# Patient Record
Sex: FEMALE | Race: WHITE | NOT HISPANIC OR LATINO | Employment: FULL TIME | URBAN - METROPOLITAN AREA
[De-identification: names, ages, dates, MRNs, and addresses within clinical notes are randomized per-mention and may not be internally consistent; named-entity substitution may affect disease eponyms.]

---

## 2017-01-17 ENCOUNTER — ALLSCRIPTS OFFICE VISIT (OUTPATIENT)
Dept: OTHER | Facility: OTHER | Age: 47
End: 2017-01-17

## 2017-03-29 ENCOUNTER — ALLSCRIPTS OFFICE VISIT (OUTPATIENT)
Dept: OTHER | Facility: OTHER | Age: 47
End: 2017-03-29

## 2017-03-29 DIAGNOSIS — G43.909 MIGRAINE WITHOUT STATUS MIGRAINOSUS, NOT INTRACTABLE: ICD-10-CM

## 2017-04-11 ENCOUNTER — GENERIC CONVERSION - ENCOUNTER (OUTPATIENT)
Dept: OTHER | Facility: OTHER | Age: 47
End: 2017-04-11

## 2017-04-11 ENCOUNTER — APPOINTMENT (OUTPATIENT)
Dept: PHYSICAL THERAPY | Facility: CLINIC | Age: 47
End: 2017-04-11
Payer: COMMERCIAL

## 2017-04-11 DIAGNOSIS — G43.909 MIGRAINE WITHOUT STATUS MIGRAINOSUS, NOT INTRACTABLE: ICD-10-CM

## 2017-04-11 PROCEDURE — 97140 MANUAL THERAPY 1/> REGIONS: CPT

## 2017-04-11 PROCEDURE — 97110 THERAPEUTIC EXERCISES: CPT

## 2017-04-11 PROCEDURE — 97162 PT EVAL MOD COMPLEX 30 MIN: CPT

## 2017-04-12 ENCOUNTER — ALLSCRIPTS OFFICE VISIT (OUTPATIENT)
Dept: OTHER | Facility: OTHER | Age: 47
End: 2017-04-12

## 2017-04-12 LAB
FLUAV AG SPEC QL IA: NEGATIVE
INFLUENZA B AG (HISTORICAL): NEGATIVE

## 2017-04-14 ENCOUNTER — APPOINTMENT (OUTPATIENT)
Dept: PHYSICAL THERAPY | Facility: CLINIC | Age: 47
End: 2017-04-14
Payer: COMMERCIAL

## 2017-04-18 ENCOUNTER — APPOINTMENT (OUTPATIENT)
Dept: PHYSICAL THERAPY | Facility: CLINIC | Age: 47
End: 2017-04-18
Payer: COMMERCIAL

## 2017-04-18 PROCEDURE — 97140 MANUAL THERAPY 1/> REGIONS: CPT

## 2017-04-18 PROCEDURE — 97014 ELECTRIC STIMULATION THERAPY: CPT

## 2017-04-18 PROCEDURE — 97110 THERAPEUTIC EXERCISES: CPT

## 2017-04-20 ENCOUNTER — APPOINTMENT (OUTPATIENT)
Dept: PHYSICAL THERAPY | Facility: CLINIC | Age: 47
End: 2017-04-20
Payer: COMMERCIAL

## 2017-04-20 ENCOUNTER — ALLSCRIPTS OFFICE VISIT (OUTPATIENT)
Dept: OTHER | Facility: OTHER | Age: 47
End: 2017-04-20

## 2017-04-20 PROCEDURE — 97014 ELECTRIC STIMULATION THERAPY: CPT

## 2017-04-20 PROCEDURE — 97110 THERAPEUTIC EXERCISES: CPT

## 2017-04-20 PROCEDURE — 97140 MANUAL THERAPY 1/> REGIONS: CPT

## 2017-04-25 ENCOUNTER — APPOINTMENT (OUTPATIENT)
Dept: PHYSICAL THERAPY | Facility: CLINIC | Age: 47
End: 2017-04-25
Payer: COMMERCIAL

## 2017-04-25 PROCEDURE — 97140 MANUAL THERAPY 1/> REGIONS: CPT

## 2017-04-25 PROCEDURE — 97010 HOT OR COLD PACKS THERAPY: CPT

## 2017-04-25 PROCEDURE — 97014 ELECTRIC STIMULATION THERAPY: CPT

## 2017-04-25 PROCEDURE — 97110 THERAPEUTIC EXERCISES: CPT

## 2017-04-27 ENCOUNTER — APPOINTMENT (OUTPATIENT)
Dept: PHYSICAL THERAPY | Facility: CLINIC | Age: 47
End: 2017-04-27
Payer: COMMERCIAL

## 2017-04-27 PROCEDURE — 97140 MANUAL THERAPY 1/> REGIONS: CPT

## 2017-04-27 PROCEDURE — 97110 THERAPEUTIC EXERCISES: CPT

## 2017-04-27 PROCEDURE — 97014 ELECTRIC STIMULATION THERAPY: CPT

## 2017-05-02 ENCOUNTER — APPOINTMENT (OUTPATIENT)
Dept: PHYSICAL THERAPY | Facility: CLINIC | Age: 47
End: 2017-05-02
Payer: COMMERCIAL

## 2017-05-04 ENCOUNTER — APPOINTMENT (OUTPATIENT)
Dept: PHYSICAL THERAPY | Facility: CLINIC | Age: 47
End: 2017-05-04
Payer: COMMERCIAL

## 2017-05-04 ENCOUNTER — ALLSCRIPTS OFFICE VISIT (OUTPATIENT)
Dept: OTHER | Facility: OTHER | Age: 47
End: 2017-05-04

## 2017-05-15 ENCOUNTER — APPOINTMENT (OUTPATIENT)
Dept: PHYSICAL THERAPY | Facility: CLINIC | Age: 47
End: 2017-05-15
Payer: COMMERCIAL

## 2017-05-15 PROCEDURE — 97014 ELECTRIC STIMULATION THERAPY: CPT

## 2017-05-15 PROCEDURE — 97140 MANUAL THERAPY 1/> REGIONS: CPT

## 2017-05-15 PROCEDURE — 97110 THERAPEUTIC EXERCISES: CPT

## 2017-05-17 ENCOUNTER — APPOINTMENT (OUTPATIENT)
Dept: PHYSICAL THERAPY | Facility: CLINIC | Age: 47
End: 2017-05-17
Payer: COMMERCIAL

## 2017-05-17 PROCEDURE — 97110 THERAPEUTIC EXERCISES: CPT

## 2017-05-17 PROCEDURE — 97140 MANUAL THERAPY 1/> REGIONS: CPT

## 2017-06-22 ENCOUNTER — GENERIC CONVERSION - ENCOUNTER (OUTPATIENT)
Dept: OTHER | Facility: OTHER | Age: 47
End: 2017-06-22

## 2017-07-05 ENCOUNTER — APPOINTMENT (OUTPATIENT)
Dept: LAB | Facility: HOSPITAL | Age: 47
End: 2017-07-05

## 2017-07-05 ENCOUNTER — TRANSCRIBE ORDERS (OUTPATIENT)
Dept: LAB | Facility: HOSPITAL | Age: 47
End: 2017-07-05

## 2017-07-05 DIAGNOSIS — Z11.1 SCREENING FOR TUBERCULOSIS: ICD-10-CM

## 2017-07-05 DIAGNOSIS — Z11.1 SCREENING FOR TUBERCULOSIS: Primary | ICD-10-CM

## 2017-07-05 PROCEDURE — 86480 TB TEST CELL IMMUN MEASURE: CPT

## 2017-07-05 PROCEDURE — 36415 COLL VENOUS BLD VENIPUNCTURE: CPT

## 2017-07-07 LAB
ANNOTATION COMMENT IMP: NORMAL
GAMMA INTERFERON BACKGROUND BLD IA-ACNC: 0.05 IU/ML
M TB IFN-G BLD-IMP: NEGATIVE
M TB IFN-G CD4+ BCKGRND COR BLD-ACNC: 0 IU/ML
M TB IFN-G CD4+ T-CELLS BLD-ACNC: 0.05 IU/ML
MITOGEN IGNF BLD-ACNC: 2.79 IU/ML
QUANTIFERON-TB GOLD IN TUBE: NORMAL
SERVICE CMNT-IMP: NORMAL

## 2017-07-31 ENCOUNTER — GENERIC CONVERSION - ENCOUNTER (OUTPATIENT)
Dept: OTHER | Facility: OTHER | Age: 47
End: 2017-07-31

## 2017-08-11 ENCOUNTER — ALLSCRIPTS OFFICE VISIT (OUTPATIENT)
Dept: OTHER | Facility: OTHER | Age: 47
End: 2017-08-11

## 2017-08-11 DIAGNOSIS — A09 INFECTIOUS GASTROENTERITIS AND COLITIS: ICD-10-CM

## 2017-09-14 ENCOUNTER — TRANSCRIBE ORDERS (OUTPATIENT)
Dept: ADMINISTRATIVE | Facility: HOSPITAL | Age: 47
End: 2017-09-14

## 2017-09-14 ENCOUNTER — APPOINTMENT (OUTPATIENT)
Dept: LAB | Facility: HOSPITAL | Age: 47
End: 2017-09-14
Payer: COMMERCIAL

## 2017-09-14 DIAGNOSIS — Z11.1 SCREENING EXAMINATION FOR PULMONARY TUBERCULOSIS: ICD-10-CM

## 2017-09-14 DIAGNOSIS — Z11.1 SCREENING EXAMINATION FOR PULMONARY TUBERCULOSIS: Primary | ICD-10-CM

## 2017-09-14 PROCEDURE — 86480 TB TEST CELL IMMUN MEASURE: CPT

## 2017-09-14 PROCEDURE — 36415 COLL VENOUS BLD VENIPUNCTURE: CPT

## 2017-09-15 ENCOUNTER — ALLSCRIPTS OFFICE VISIT (OUTPATIENT)
Dept: OTHER | Facility: OTHER | Age: 47
End: 2017-09-15

## 2017-09-15 DIAGNOSIS — Z15.02 GENETIC SUSCEPTIBILITY TO MALIGNANT NEOPLASM OF OVARY: ICD-10-CM

## 2017-09-15 DIAGNOSIS — Z15.01 GENETIC SUSCEPTIBILITY TO MALIGNANT NEOPLASM OF BREAST: ICD-10-CM

## 2017-09-15 DIAGNOSIS — Z98.890 OTHER SPECIFIED POSTPROCEDURAL STATES: ICD-10-CM

## 2017-09-16 LAB
ANNOTATION COMMENT IMP: NORMAL
GAMMA INTERFERON BACKGROUND BLD IA-ACNC: 0.2 IU/ML
M TB IFN-G BLD-IMP: NEGATIVE
M TB IFN-G CD4+ BCKGRND COR BLD-ACNC: <0.01 IU/ML
M TB IFN-G CD4+ T-CELLS BLD-ACNC: 0.13 IU/ML
MITOGEN IGNF BLD-ACNC: >10 IU/ML
QUANTIFERON-TB GOLD IN TUBE: NORMAL
SERVICE CMNT-IMP: NORMAL

## 2017-09-26 ENCOUNTER — HOSPITAL ENCOUNTER (OUTPATIENT)
Dept: RADIOLOGY | Facility: HOSPITAL | Age: 47
Discharge: HOME | End: 2017-09-26
Attending: SURGERY
Payer: COMMERCIAL

## 2017-09-26 DIAGNOSIS — Z15.02 GENETIC SUSCEPTIBILITY TO MALIGNANT NEOPLASM OF OVARY: ICD-10-CM

## 2017-09-26 DIAGNOSIS — Z15.01 GENETIC SUSCEPTIBILITY TO MALIGNANT NEOPLASM OF BREAST: ICD-10-CM

## 2017-09-26 DIAGNOSIS — Z98.890 OTHER SPECIFIED POSTPROCEDURAL STATES: ICD-10-CM

## 2017-09-26 PROCEDURE — C8908 MRI W/O FOL W/CONT, BREAST,: HCPCS

## 2017-10-04 ENCOUNTER — ALLSCRIPTS OFFICE VISIT (OUTPATIENT)
Dept: OTHER | Facility: OTHER | Age: 47
End: 2017-10-04

## 2017-10-05 NOTE — PROGRESS NOTES
Assessment  1  Migraine headache (346 90) (G43 909)    Plan  Migraine headache    · Rizatriptan Benzoate 10 MG Oral Tablet (Maxalt); TAKE 1 TABLET AT ONSET OF  HEADACHE  MAY REPEAT EVERY 2 HOURS AS NEEDED  MAXIMUM 2 TABLETS IN 24  HOURS    Discussion/Summary    Continue with current medications  follow up visit for acupuncture in several weeks  History of Present Illness  follow up visit for acupuncture  History of migraines  she uses as needed Maxalt or Excedrin on a limited basis  She sees a chiropractor periodically  She has also been doing yoga  Active Problems  1  Anxiety (300 00) (F41 9)   2  BRCA gene positive (V84 01,V84 02) (Z15 01,Z15 02)   3  Breast Reconstruction With Implant Prosthesis Bilateral   4  Depression (311) (F32 9)   5  Ductal carcinoma in situ (DCIS) of breast, unspecified laterality (233 0) (D05 10)   6  Esophageal reflux (530 81) (K21 9)   7  Flu-like symptoms (780 99) (R68 89)   8  H/O breast reconstruction (V43 82) (Z98 82)   9  History of melanoma (V10 82) (Z85 820)   10  Infectious diarrhea (009 2) (A09)   11  Lymphedema of extremity (457 1) (I89 0)   12  Menopausal symptom (627 2) (N95 1)   13  Migraine headache (346 90) (G43 909)   14  Myofascial pain syndrome (729 1) (M79 1)   15  S/P breast reconstruction, bilateral (V43 82) (Z98 890)   16  Toxic multinodular goiter (242 20) (E05 20)    Past Medical History  1  History of Easy Bruising Tendency   2  History of iritis (V12 49) (Z86 69)   3  History of Malignant Melanoma Of The Thigh (172 7)   4  History of Strain of right gastrocnemius muscle (844 8) (S89 086J)    Surgical History  1  History of Excision Of Lesion Lower Extremity Right   2  History of Hysterectomy Robotic-Assisted   3  History of Knee Surgery   4  History of Reported Hx Of Breast Surgery For Biopsy   5  History of Salpingo-oophorectomy Bilateral   6  History of [place] Lymph Node Biopsy   7   History of Simple Mastectomy Bilateral    Family History  Mother 1  Family history of Breast Cancer (V16 3)  Father    2  Family history of Liver Cancer  Maternal Grandmother    3  Family history of Kidney Cancer (V16 51)  Maternal Aunt    4  Family history of Breast Cancer (V16 3)   5  Family history of Thyroid Disorder (V18 19)  Paternal Aunt    10  Family history of Atrial Myocardial Infarction   7  Family history of Diabetes Mellitus (V18 0)  Family History    8  Family history of Colon Cancer (V16 0)   9  Family history of Lung Cancer (V16 1)    Social History   · Being A Social Drinker   · Denied: History of Drug Use   · Never A Smoker    Current Meds   1  Dymista 137-50 MCG/ACT Nasal Suspension; USE AS DIRECTED Recorded   2  Excedrin TABS; TAKE 1 TABLET 3 TIMES DAILY AS NEEDED; Therapy: (Recorded:11Mar2015) to Recorded   3  Florastor 250 MG Oral Capsule Recorded   4  Maxalt 10 MG Oral Tablet; TAKE 1 TABLET AT ONSET OF HEADACHE  MAY REPEAT   EVERY 2 HOURS AS NEEDED  MAXIMUM 2 TABLETS IN 24 HOURS  Requested for:   55Tvl7286; Last Rx:68Ldc2976 Ordered   5  MethIMAzole 10 MG Oral Tablet; take 0 5 tablet daily; Therapy: (Recorded:11Aug2017) to Recorded   6  Premarin 0 625 MG Oral Tablet; TAKE 1 TABLET DAILY AS DIRECTED; Therapy: 40Czg8349 to [phone])  Requested for: 41ENG0120; Last   Rx:95Xgl7744 Ordered   7  Venlafaxine HCl ER 37 5 MG Oral Capsule Extended Release 24 Hour; TAKE 1   CAPSULE ONCE DAILY WITH FOOD - MID DAY; Therapy: 24Kww6184 to (Last Rx:68Pcc8953)  Requested for: 43Hif3677 Ordered    Allergies  1  Morphine Sulfate (PF) SOLN  2  Animal dander - Cats   3  Dust   4  Mold    Procedure    Procedure: Acupuncture   Indication: Migraine headaches  Risks, benefits and alternatives were discussed with the patient  Procedure Note:   GATES3, LI 4  GV 20  EAR DAVIS MEN    824  5YANG (temples) BILATERALLY  14, GB 20, GB 2110  369, SP 63, BL 60        Future Appointments    Date/Time Provider Specialty Site   03/16/2018 08:30 AM Garnet Felty, [facility]   11/13/2017 09:15 AM Andrew Domínguez MD Surgical On[facility]   04/20/2018 09:15 AM Kimberlee Sanchez [facility]     Signatures   Electronically signed by : ALE Lynch ; Oct  4 2017  5:46PM EST                       (Author)

## 2017-11-17 ENCOUNTER — GENERIC CONVERSION - ENCOUNTER (OUTPATIENT)
Dept: OTHER | Facility: OTHER | Age: 47
End: 2017-11-17

## 2017-12-16 ENCOUNTER — OFFICE VISIT (OUTPATIENT)
Dept: URGENT CARE | Age: 47
End: 2017-12-16
Payer: COMMERCIAL

## 2017-12-16 PROCEDURE — G0382 LEV 3 HOSP TYPE B ED VISIT: HCPCS | Performed by: FAMILY MEDICINE

## 2017-12-20 ENCOUNTER — ALLSCRIPTS OFFICE VISIT (OUTPATIENT)
Dept: OTHER | Facility: OTHER | Age: 47
End: 2017-12-20

## 2017-12-21 NOTE — PROGRESS NOTES
Assessment   1  URI (upper respiratory infection) (465 9) (J06 9)    Plan   URI (upper respiratory infection)    · Zithromax Z-Fadi 250 MG Oral Tablet (Azithromycin); TAKE 2 TABLETS ON DAY 1    THEN TAKE 1 TABLET A DAY FOR 4 DAYS   · Follow Up if Not Better Evaluation and Treatment  Follow-up  Status: Complete  Done:    66PQZ9825   · Drink at least 6 glasses of water or juice a day ; Status:Complete;   Done: 40GOW3881    02:49PM   · Use a cough medicine to help you get adequate rest ; Status:Complete;   Done:    54UJE0856 02:49PM   · Call [phone] if: The symptoms are not better in 7 days ; Status:Complete;   Done:    85SAN6472 02:49PM   · Call [phone] if: The symptoms seem worse ; Status:Complete;   Done:    90UPF9429 02:49PM    Discussion/Summary      Continue with symptom treatment for cough and nasal congestion  Sample of Dulera 100 /5 two puffs BID x 5 days  Chief Complaint   1  Cough    History of Present Illness   HPI: Patient presents with a five-day history of persistent cough productive of yellow-green phlegm with nasal congestion  she has been using as needed Mucinex/NyQuil  no relief with ProAir MDI non smoker  Review of Systems        Constitutional: chills, but-- no fever  ENT: hoarseness, but-- no earache,-- no sore throat-- and-- no nasal discharge  Respiratory: as noted in HPI,-- no shortness of breath-- and-- no wheezing  Gastrointestinal: no nausea,-- no vomiting-- and-- no diarrhea  Musculoskeletal: no myalgias  Neurological: no headache  Active Problems   1  Anxiety (300 00) (F41 9)   2  BRCA gene positive (V84 01,V84 02) (Z15 01,Z15 02)   3  Breast Reconstruction With Implant Prosthesis Bilateral   4  Depression (311) (F32 9)   5  Ductal carcinoma in situ (DCIS) of breast, unspecified laterality (233 0) (D05 10)   6  Esophageal reflux (530 81) (K21 9)   7  H/O breast reconstruction (V43 82) (Z98 82)   8   History of melanoma (V10 82) (Z85 820)   9  Lymphedema of extremity (457 1) (I89 0)   10  Menopausal symptom (627 2) (N95 1)   11  Migraine headache (346 90) (G43 909)   12  Myofascial pain syndrome (729 1) (M79 1)   13  S/P breast reconstruction, bilateral (V43 82) (Z98 890)   14  Toxic multinodular goiter (242 20) (E05 20)    Past Medical History   1  History of Easy Bruising Tendency   2  History of Flu-like symptoms (780 99) (R68 89)   3  History of infectious diarrhea (V12 79) (Z87 19)   4  History of iritis (V12 49) (Z86 69)   5  History of Malignant Melanoma Of The Thigh (172 7)   6  History of Strain of right gastrocnemius muscle (844 8) (K48 102S)    Family History   Mother    1  Family history of Breast Cancer (V16 3)  Father    2  Family history of Liver Cancer  Maternal Grandmother    3  Family history of Kidney Cancer (V16 51)  Maternal Aunt    4  Family history of Breast Cancer (V16 3)   5  Family history of Thyroid Disorder (V18 19)  Paternal Aunt    10  Family history of Atrial Myocardial Infarction   7  Family history of Diabetes Mellitus (V18 0)  Family History    8  Family history of Colon Cancer (V16 0)   9  Family history of Lung Cancer (V16 1)    Social History    · Being A Social Drinker   · Denied: History of Drug Use   · Never A Smoker    Surgical History   1  History of Excision Of Lesion Lower Extremity Right   2  History of Hysterectomy Robotic-Assisted   3  History of Knee Surgery   4  History of Reported Hx Of Breast Surgery For Biopsy   5  History of Salpingo-oophorectomy Bilateral   6  History of [place] Lymph Node Biopsy   7  History of Simple Mastectomy Bilateral    Current Meds    1  Dymista 137-50 MCG/ACT Nasal Suspension; USE AS DIRECTED Recorded   2  Excedrin TABS; TAKE 1 TABLET 3 TIMES DAILY AS NEEDED; Therapy: (Recorded:11Mar2015) to Recorded   3  Florastor 250 MG Oral Capsule Recorded   4  MethIMAzole 10 MG Oral Tablet; take 0 5 tablet daily; Therapy: (Recorded:11Aug2017) to Recorded   5  Premarin 0 625 MG Oral Tablet; TAKE 1 TABLET DAILY AS DIRECTED; Therapy: 89Dce6784 to (Evaluate:16Shq3432)  Requested for: 25Oct2017; Last     YQ:88OMF8164 Ordered   6  ProAir  (90 Base) MCG/ACT Inhalation Aerosol Solution; inhale 1 to 2 puffs every     4 to 6 hours as needed; Therapy: 47BAT7297 to (Last Rx:72Vkk0642)  Requested for: 91NGD3722 Ordered   7  Rizatriptan Benzoate 10 MG Oral Tablet; TAKE 1 TABLET AT ONSET OF HEADACHE  MAY REPEAT EVERY 2 HOURS AS NEEDED  MAXIMUM 2 TABLETS IN 24 HOURS      Requested for: 26KNQ7019; Last Rx:29Xrz4699 Ordered   8  Venlafaxine HCl ER 37 5 MG Oral Capsule Extended Release 24 Hour; TAKE 1     CAPSULE ONCE DAILY WITH FOOD - MID DAY; Therapy: 22Fbw0720 to (Last Rx:69Pse1731)  Requested for: 88Sjt7899 Ordered    Allergies   1  Morphine Sulfate (PF) SOLN  2  Animal dander - Cats   3  Dust   4  Mold    Vitals    Recorded: 20Dec2017 02:30PM   Temperature 98 2 F   Heart Rate 80   Respiration 16   Systolic 843   Diastolic 72   Height 5 ft 4 in   Weight 129 lb 6 4 oz   BMI Calculated 22 21   BSA Calculated 1 63     Physical Exam        Constitutional      General appearance: No acute distress, well appearing and well nourished  Head and Face      Palpation of the face and sinuses: No sinus tenderness  Eyes      Conjunctiva and lids: No swelling, erythema or discharge  Ears, Nose, Mouth, and Throat      Otoscopic examination: Tympanic membranes translucent with normal light reflex  Canals patent without erythema  Oropharynx: Normal with no erythema, edema, exudate or lesions  Neck      Neck: Supple, symmetric, trachea midline, no masses  Thyroid: Normal, no thyromegaly  Pulmonary      Respiratory effort: No increased work of breathing or signs of respiratory distress  Auscultation of lungs: Clear to auscultation  Cardiovascular      Auscultation of heart: Normal rate and rhythm, normal S1 and S2, no murmurs  Lymphatic      Palpation of lymph nodes in neck: No lymphadenopathy         Future Appointments      Date/Time Provider Specialty Site   03/16/2018 08:30 AM ALE Pearce  Plastic Surgery BODY [facility]OL[facility] PLASTIC [facility]   05/17/2018 08:15 AM Doreen Apley, MD Surgical On[facility]   04/20/2018 09:15 AM Liane Flower, [facility] Gynecological Oncology [facility]     Signatures    Electronically signed by : Emilia Fothergill, M D ; Dec 20 2017  2:49PM EST                       (Author)

## 2017-12-27 NOTE — PROGRESS NOTES
Assessment   1  Acute viral bronchitis (466 0) (J20 8)    Plan   Acute viral bronchitis    · ProAir  (90 Base) MCG/ACT Inhalation Aerosol Solution; inhale 1 to 2 puffs    every 4 to 6 hours as needed    Discussion/Summary   Discussion Summary:    Viral URI is advised to use inhaler p r n  for chest tightness and severe coughing  Continue using Mucinex for congestion  Chloraseptic for throat discomfort  Follow up with PCP  Medication Side Effects Reviewed: Possible side effects of new medications were reviewed with the patient/guardian today  Understands and agrees with treatment plan: The treatment plan was reviewed with the patient/guardian  The patient/guardian understands and agrees with the treatment plan    Counseling Documentation With Imm: The patient was counseled regarding instructions for management,-- risk factor reductions,-- risks and benefits of treatment options,-- importance of compliance with treatment  Follow Up Instructions: Follow Up with your Primary Care Provider in 3-5 days  If your symptoms worsen, go to the nearest [place] [address] Emergency Department  Chief Complaint   1  Cold Symptoms  Chief Complaint Free Text Note Form: Bodyaches,nasal and chest congestion, laryngitis x 4 days  History of Present Illness   HPI: As above presents to the urgent care clinic for complaints of body aches time 4 days, with coughing, minimal mucus but feels congested in her chest, with no fever or chills  Denies dizziness or chest pain  No shortness of breath or diarrhea  No recent travel  Has not been around any sick persons  She reports she feels some chest tightness in her lungs, intermittent, mild  Hospital Based Practices Required Assessment:      Pain Assessment      the patient states they have pain  (on a scale of 0 to 10, the patient rates the pain at 3 )      Abuse And Domestic Violence Screen       Yes, the patient is safe at home  -- The patient states no one is hurting them        Depression And Suicide Screen  No, the patient has not had thoughts of hurting themself  No, the patient has not felt depressed in the past 7 days  Education Completed: disease/condition-- and-- treatment/procedure      Teaching Method: verbal      Person Taught: patient      Evaluation Of Learning: verbalized/demonstrated understanding    Cold Symptoms: Cisco Chiang presents with complaints of cold symptoms  Associated symptoms include nasal congestion,-- runny nose,-- hoarseness,-- productive cough,-- shortness of breath-- and-- fatigue, but-- no sneezing,-- no sore throat,-- no facial pressure,-- no facial pain,-- no headache,-- no ear pain,-- no wheezing,-- no nausea,-- no vomiting,-- no diarrhea,-- no fever-- and-- no chills  Active Problems   1  Anxiety (300 00) (F41 9)   2  BRCA gene positive (V84 01,V84 02) (Z15 01,Z15 02)   3  Breast Reconstruction With Implant Prosthesis Bilateral   4  Depression (311) (F32 9)   5  Ductal carcinoma in situ (DCIS) of breast, unspecified laterality (233 0) (D05 10)   6  Esophageal reflux (530 81) (K21 9)   7  H/O breast reconstruction (V43 82) (Z98 82)   8  History of melanoma (V10 82) (Z85 820)   9  Lymphedema of extremity (457 1) (I89 0)   10  Menopausal symptom (627 2) (N95 1)   11  Migraine headache (346 90) (G43 909)   12  Myofascial pain syndrome (729 1) (M79 1)   13  S/P breast reconstruction, bilateral (V43 82) (Z98 890)   14  Toxic multinodular goiter (242 20) (E05 20)    Past Medical History   1  History of Easy Bruising Tendency   2  History of Flu-like symptoms (780 99) (R68 89)   3  History of infectious diarrhea (V12 79) (Z87 19)   4  History of iritis (V12 49) (Z86 69)   5  History of Malignant Melanoma Of The Thigh (172 7)   6  History of Strain of right gastrocnemius muscle (844 8) (C68 428P)  Active Problems And Past Medical History Reviewed: The active problems and past medical history were reviewed and updated today  Family History   Mother    1  Family history of Breast Cancer (V16 3)  Father    2  Family history of Liver Cancer  Maternal Grandmother    3  Family history of Kidney Cancer (V16 51)  Maternal Aunt    4  Family history of Breast Cancer (V16 3)   5  Family history of Thyroid Disorder (V18 19)  Paternal Aunt    10  Family history of Atrial Myocardial Infarction   7  Family history of Diabetes Mellitus (V18 0)  Family History    8  Family history of Colon Cancer (V16 0)   9  Family history of Lung Cancer (V16 1)  Family History Reviewed: The family history was reviewed and updated today  Social History    · Being A Social Drinker   · Denied: History of Drug Use   · Never A Smoker  Social History Reviewed: The social history was reviewed and updated today  The social history was reviewed and is unchanged  Surgical History   1  History of Excision Of Lesion Lower Extremity Right   2  History of Hysterectomy Robotic-Assisted   3  History of Knee Surgery   4  History of Reported Hx Of Breast Surgery For Biopsy   5  History of Salpingo-oophorectomy Bilateral   6  History of [place] Lymph Node Biopsy   7  History of Simple Mastectomy Bilateral  Surgical History Reviewed: The surgical history was reviewed and updated today  Current Meds    1  Dymista 137-50 MCG/ACT Nasal Suspension; USE AS DIRECTED Recorded   2  Excedrin TABS; TAKE 1 TABLET 3 TIMES DAILY AS NEEDED; Therapy: (Recorded:11Mar2015) to Recorded   3  Florastor 250 MG Oral Capsule Recorded   4  MethIMAzole 10 MG Oral Tablet; take 0 5 tablet daily; Therapy: (Recorded:11Aug2017) to Recorded   5  Premarin 0 625 MG Oral Tablet; TAKE 1 TABLET DAILY AS DIRECTED; Therapy: 27Apr2015 to (Evaluate:79Oxw6906)  Requested for: 25Oct2017; Last     XL:[id number] Ordered   6  Rizatriptan Benzoate 10 MG Oral Tablet; TAKE 1 TABLET AT ONSET OF HEADACHE  MAY     REPEAT EVERY 2 HOURS AS NEEDED   MAXIMUM 2 TABLETS IN 24 HOURS      Requested for: 29EEH9469; Last Rx:76Rut2077 Ordered   7  Venlafaxine HCl ER 37 5 MG Oral Capsule Extended Release 24 Hour; TAKE 1     CAPSULE ONCE DAILY WITH FOOD - MID DAY; Therapy: 22Cwi3999 to (Last Rx:51Tom6896)  Requested for:  Ordered  Medication List Reviewed: The medication list was reviewed and updated today  Allergies   1  Morphine Sulfate (PF) SOLN  2  Animal dander - Cats   3  Dust   4  Mold    Vitals   Signs   Recorded: 06AOH8095 11:51AM   LMP: hysterectomy    Physical Exam        Constitutional      General appearance: No acute distress, well appearing and well nourished  well developed,-- normal body odor,-- does not smell of feces,-- does not smell of urine,-- well nourished,-- clothing appropriate-- and-- well groomed  Ears, Nose, Mouth, and Throat      Otoscopic examination: Tympanic membranes translucent with normal light reflex  Canals patent without erythema  Nasal mucosa, septum, and turbinates: Abnormal  -- 1-2+ turbinates, boggy, moderate nasal discharge  Oropharynx: Normal with no erythema, edema, exudate or lesions  Pulmonary      Respiratory effort: No increased work of breathing or signs of respiratory distress  Auscultation of lungs: Abnormal  -- mild congestion in the B/L lungs, no decreased breath siunds, no rhonchi  Cardiovascular      Auscultation of heart: Normal rate and rhythm, normal S1 and S2, without murmurs         Psychiatric      Orientation to person, place, and time: Normal        Mood and affect: Normal        Future Appointments      Date/Time Provider Specialty Site   03/16/2018 08:30 AM ALE Alcala  [facility]   05/17/2018 08:15 AM Joseph Matute MD Surgical On[facility]   04/20/2018 09:15 AM Stevie Wild [facility] Gynecological On[facility]     Signatures    Electronically signed by : ANGELICA Bruno; Dec 16 2017  9:30AM EST                       (Author)     Electronically signed by : Abdoul Iraheta; Dec 16 2017  9:33AM EST                       (Author)     Electronically signed by : ALE Weeks ; Dec 26 2017 11:17AM EST

## 2018-01-08 ENCOUNTER — ALLSCRIPTS OFFICE VISIT (OUTPATIENT)
Dept: OTHER | Facility: OTHER | Age: 48
End: 2018-01-08

## 2018-01-12 VITALS
BODY MASS INDEX: 21.95 KG/M2 | RESPIRATION RATE: 16 BRPM | WEIGHT: 128.6 LBS | DIASTOLIC BLOOD PRESSURE: 80 MMHG | HEART RATE: 74 BPM | TEMPERATURE: 98.1 F | HEIGHT: 64 IN | SYSTOLIC BLOOD PRESSURE: 120 MMHG

## 2018-01-13 VITALS
RESPIRATION RATE: 16 BRPM | WEIGHT: 134.5 LBS | DIASTOLIC BLOOD PRESSURE: 72 MMHG | TEMPERATURE: 98.7 F | SYSTOLIC BLOOD PRESSURE: 102 MMHG | HEART RATE: 84 BPM | BODY MASS INDEX: 22.96 KG/M2 | HEIGHT: 64 IN

## 2018-01-13 VITALS
HEART RATE: 72 BPM | WEIGHT: 132.25 LBS | BODY MASS INDEX: 22.58 KG/M2 | TEMPERATURE: 98.1 F | HEIGHT: 64 IN | RESPIRATION RATE: 14 BRPM | SYSTOLIC BLOOD PRESSURE: 110 MMHG | DIASTOLIC BLOOD PRESSURE: 78 MMHG

## 2018-01-13 VITALS
DIASTOLIC BLOOD PRESSURE: 80 MMHG | HEIGHT: 64 IN | SYSTOLIC BLOOD PRESSURE: 106 MMHG | TEMPERATURE: 98.5 F | RESPIRATION RATE: 16 BRPM | WEIGHT: 134.4 LBS | BODY MASS INDEX: 22.94 KG/M2 | HEART RATE: 76 BPM

## 2018-01-13 VITALS
RESPIRATION RATE: 16 BRPM | TEMPERATURE: 97.6 F | HEART RATE: 80 BPM | WEIGHT: 131 LBS | BODY MASS INDEX: 22.36 KG/M2 | HEIGHT: 64 IN | SYSTOLIC BLOOD PRESSURE: 102 MMHG | DIASTOLIC BLOOD PRESSURE: 64 MMHG

## 2018-01-14 VITALS
WEIGHT: 135.25 LBS | BODY MASS INDEX: 23.09 KG/M2 | HEART RATE: 82 BPM | TEMPERATURE: 97.9 F | DIASTOLIC BLOOD PRESSURE: 70 MMHG | SYSTOLIC BLOOD PRESSURE: 92 MMHG | HEIGHT: 64 IN | RESPIRATION RATE: 16 BRPM

## 2018-01-15 NOTE — CONSULTS
I had the pleasure of evaluating your patient, Arnaud Emanuel  My full evaluation follows:      Chief Complaint  Bilateral breast reconstruction follow-up visit      History of Present Illness  Cherelle Alegre is a very pleasant 17-year-old female who is now 3 years status post bilateral breast tissue expander/implant exchange (silicone)  She had initially undergone immediate bilateral breast reconstruction with tissue expanders and acellular dermal matrix  Review of Systems    Constitutional: no fever and no chills  Eyes: no eyesight problems  ENT: no hearing loss  Cardiovascular: no chest pain  Respiratory: no shortness of breath and no wheezing  Gastrointestinal: no nausea, no constipation and no diarrhea  Genitourinary: no dysuria and no incontinence  Musculoskeletal: no limb swelling  Integumentary: no rashes and no skin wound  Neurological: headache, but no confusion and no convulsions  Endocrine: no proptosis and no deepening of the voice  Hematologic/Lymphatic: no tendency for easy bleeding and no tendency for easy bruising  Active Problems    1  Anxiety (300 00) (F41 9)   2  BRCA gene positive (V84 01,V84 02) (Z15 01,Z15 02)   3  Depression (311) (F32 9)   4  Ductal carcinoma in situ (DCIS) of breast, unspecified laterality (233 0) (D05 10)   5  Esophageal reflux (530 81) (K21 9)   6  Flu-like symptoms (780 99) (R68 89)   7  History of melanoma (V10 82) (Z85 820)   8  Infectious diarrhea (009 2) (A09)   9  Lymphedema of extremity (457 1) (I89 0)   10  Menopausal symptom (627 2) (N95 1)   11  Migraine headache (346 90) (G43 909)   12  Myofascial pain syndrome (729 1) (M79 1)   13   Toxic multinodular goiter (242 20) (E05 20)    Past Medical History    · History of Easy Bruising Tendency   · History of iritis (V12 49) (Z86 69)   · History of Malignant Melanoma Of The Thigh (172 7)   · History of Strain of right gastrocnemius muscle (844 8) (E72 352T)    Surgical History    · History of Excision Of Lesion Lower Extremity Right   · History of Hysterectomy Robotic-Assisted   · History of Knee Surgery   · History of Reported Hx Of Breast Surgery For Biopsy   · History of Salpingo-oophorectomy Bilateral   · History of [place] Lymph Node Biopsy   · History of Simple Mastectomy Bilateral    The surgical history was reviewed and updated today  Family History    · Family history of Breast Cancer (V16 3)    · Family history of Liver Cancer    · Family history of Kidney Cancer (V16 51)    · Family history of Breast Cancer (V16 3)   · Family history of Thyroid Disorder (V18 19)    · Family history of Atrial Myocardial Infarction   · Family history of Diabetes Mellitus (V18 0)    · Family history of Colon Cancer (V16 0)   · Family history of Lung Cancer (V16 1)    The family history was reviewed and updated today  Social History    · Being A Social Drinker   · Denied: History of Drug Use   · Never A Smoker  The social history was reviewed and updated today  The social history was reviewed and is unchanged  Current Meds   1  Ciprofloxacin HCl - 500 MG Oral Tablet; TAKE 1 TABLET EVERY 12 HOURS DAILY; Therapy: 11Aug2017 to (Evaluate:14Hfe9958)  Requested for: 11Aug2017; Last   Rx:11Aug2017 Ordered   2  Dymista 137-50 MCG/ACT Nasal Suspension; USE AS DIRECTED Recorded   3  Excedrin TABS; TAKE 1 TABLET 3 TIMES DAILY AS NEEDED; Therapy: (Recorded:11Mar2015) to Recorded   4  Florastor 250 MG Oral Capsule Recorded   5  Maxalt 10 MG Oral Tablet; TAKE 1 TABLET AT ONSET OF HEADACHE  MAY REPEAT   EVERY 2 HOURS AS NEEDED  MAXIMUM 2 TABLETS IN 24 HOURS  Requested for:   77Nec5308; Last Rx:20Hzd8393 Ordered   6  MethIMAzole 10 MG Oral Tablet; take 0 5 tablet daily; Therapy: (Recorded:11Aug2017) to Recorded   7  Premarin 0 625 MG Oral Tablet; TAKE 1 TABLET DAILY AS DIRECTED; Therapy: 27Lqb2568 to [phone])  Requested for: 59FAH2653; Last   Rx:52Fmg5900 Ordered   8   Venlafaxine HCl ER 37 5 MG Oral Capsule Extended Release 24 Hour; TAKE 1   CAPSULE ONCE DAILY WITH FOOD - MID DAY; Therapy: 66Hit0223 to (Last Rx:45Vzq0959)  Requested for: 75Ung7327 Ordered    The medication list was reviewed and updated today  Allergies    1  Morphine Sulfate (PF) SOLN    2  Animal dander - Cats   3  Dust   4  Mold    Physical Exam    Constitutional   General appearance: No acute distress, well appearing and well nourished  Eyes   Conjunctiva and lids: No swelling, erythema or discharge  Pupils and irises: Equal, round and reactive to light  Ears, Nose, Mouth, and Throat   External inspection of ears and nose: Normal     Pulmonary   Respiratory effort: No increased work of breathing or signs of respiratory distress  Musculoskeletal   Gait and station: Normal     Skin   Skin and subcutaneous tissue: Normal without rashes or lesions  Bilateral breast examination reveals the presence of bilateral breast implants, the breasts are soft and supple and without evidence of significant capsular contracture (there is minimal "tightness" bilaterally that is unchanged from her previous visit)  Psychiatric   Orientation to person, place, and time: Normal     Mood and affect: Normal        Assessment    1  Breast Reconstruction With Implant Prosthesis Bilateral   2  H/O breast reconstruction (B15 99) (R40 10)    Discussion/Summary    Please see history of present illness  Overall, she is doing well, and she is pleased with the results of the breast reconstruction  For the time being she is not interested in nipple reconstruction, although she has been Considering this as of late  She is 3 years status post implant placement and we will schedule her to have MRI performed to assess bilateral silicone implant integrity  She'll be seen back in the office in 6 months for routine follow-up visit pending results of the MRI  Thank you very much for allowing me to participate in the care of this patient   If you have any questions, please do not hesitate to contact me        Signatures   Electronically signed by : ALE Rodriguez ; Sep 15 2017  8:45AM EST                       (Author)    Electronically signed by : ALE Rodriguez ; Sep 15 2017  8:45AM EST                       (Author)    Electronically signed by : ALE Rodriguez ; Sep 15 2017  8:46AM EST                       (Author)

## 2018-01-22 VITALS
RESPIRATION RATE: 16 BRPM | DIASTOLIC BLOOD PRESSURE: 70 MMHG | SYSTOLIC BLOOD PRESSURE: 112 MMHG | TEMPERATURE: 97.9 F | HEIGHT: 64 IN | WEIGHT: 133.63 LBS | BODY MASS INDEX: 22.81 KG/M2 | HEART RATE: 82 BPM

## 2018-01-22 VITALS
TEMPERATURE: 98.2 F | BODY MASS INDEX: 22.09 KG/M2 | HEART RATE: 80 BPM | RESPIRATION RATE: 16 BRPM | SYSTOLIC BLOOD PRESSURE: 110 MMHG | HEIGHT: 64 IN | WEIGHT: 129.4 LBS | DIASTOLIC BLOOD PRESSURE: 72 MMHG

## 2018-01-23 VITALS
WEIGHT: 127 LBS | BODY MASS INDEX: 21.68 KG/M2 | DIASTOLIC BLOOD PRESSURE: 74 MMHG | TEMPERATURE: 97.4 F | HEIGHT: 64 IN | RESPIRATION RATE: 16 BRPM | SYSTOLIC BLOOD PRESSURE: 122 MMHG | HEART RATE: 86 BPM

## 2018-01-23 NOTE — MISCELLANEOUS
Message  Return to work or school:   Hazel Vera is under my professional care   She was seen in my office on 01-   She is able to return to work on  01-            Signatures   Electronically signed by : ALE Shannon ; Jan 8 2018 12:10PM EST                       (Author)

## 2018-04-05 ENCOUNTER — TELEPHONE (OUTPATIENT)
Dept: FAMILY MEDICINE CLINIC | Facility: CLINIC | Age: 48
End: 2018-04-05

## 2018-04-05 DIAGNOSIS — N95.1 MENOPAUSAL SYMPTOMS: Primary | ICD-10-CM

## 2018-04-05 RX ORDER — VENLAFAXINE HYDROCHLORIDE 37.5 MG/1
CAPSULE, EXTENDED RELEASE ORAL
COMMUNITY
Start: 2015-08-06 | End: 2018-04-05 | Stop reason: SDUPTHER

## 2018-04-05 RX ORDER — VENLAFAXINE HYDROCHLORIDE 37.5 MG/1
37.5 CAPSULE, EXTENDED RELEASE ORAL DAILY
Qty: 90 CAPSULE | Refills: 3 | Status: SHIPPED | OUTPATIENT
Start: 2018-04-05 | End: 2018-12-12 | Stop reason: SDUPTHER

## 2018-04-05 NOTE — TELEPHONE ENCOUNTER
MEDS NOT LISTED IN PATIENT'S CHART      Patient is requesting a refill    VENLAFAXINE HCL ER  37 5 MG 1 PILL DAILY #90  90 DAY SUPPLY    [facility]

## 2018-04-06 ENCOUNTER — OFFICE VISIT (OUTPATIENT)
Dept: PLASTIC SURGERY | Facility: CLINIC | Age: 48
End: 2018-04-06
Payer: COMMERCIAL

## 2018-04-06 VITALS — BODY MASS INDEX: 21.68 KG/M2 | WEIGHT: 127 LBS | HEIGHT: 64 IN

## 2018-04-06 DIAGNOSIS — Z85.3 PERSONAL HISTORY OF BREAST CANCER: Primary | ICD-10-CM

## 2018-04-06 PROCEDURE — 99213 OFFICE O/P EST LOW 20 MIN: CPT | Performed by: PHYSICIAN ASSISTANT

## 2018-04-06 RX ORDER — METHIMAZOLE 10 MG/1
0.5 TABLET ORAL EVERY OTHER DAY
COMMUNITY
End: 2019-01-29

## 2018-04-06 RX ORDER — AZELASTINE HYDROCHLORIDE, FLUTICASONE PROPIONATE 137; 50 UG/1; UG/1
SPRAY, METERED NASAL 2 TIMES DAILY
COMMUNITY
End: 2019-01-11 | Stop reason: ALTCHOICE

## 2018-04-06 RX ORDER — RIZATRIPTAN BENZOATE 10 MG/1
TABLET ORAL
COMMUNITY
End: 2018-09-05 | Stop reason: SDUPTHER

## 2018-04-06 RX ORDER — ACETAMINOPHEN, ASPIRIN AND CAFFEINE 250; 250; 65 MG/1; MG/1; MG/1
1 TABLET, FILM COATED ORAL 3 TIMES DAILY PRN
COMMUNITY
End: 2019-05-15 | Stop reason: ALTCHOICE

## 2018-04-06 RX ORDER — SACCHAROMYCES BOULARDII 250 MG
CAPSULE ORAL
COMMUNITY
End: 2019-01-11 | Stop reason: ALTCHOICE

## 2018-04-06 NOTE — PROGRESS NOTES
Assessment/Plan:   Rickey Mcconnell is a pleasant 62-year-old female with a history for breast cancer status post bilateral breast reconstruction with tissue expander removal and implant exchange approximately 3 and half years ago  Please see HPI  She continues to do displacement exercises  She is currently not interested in nipple reconstruction  She continues to follow-up with Dr Rubia Forman  We will see her back in 1 year or sooner with any concerns  Diagnoses and all orders for this visit:    Personal history of breast cancer    Other orders  -     Azelastine-Fluticasone (DYMISTA) 137-50 MCG/ACT SUSP; into each nostril Twice daily  -     aspirin-acetaminophen-caffeine (EXCEDRIN MIGRAINE) 250-250-65 MG per tablet; Take 1 tablet by mouth 3 (three) times a day as needed  -     saccharomyces boulardii (FLORASTOR) 250 mg capsule; Take by mouth  -     methimazole (TAPAZOLE) 10 mg tablet; Take 0 5 tablets by mouth daily  -     conjugated estrogens (PREMARIN) 0 625 mg tablet; Take 1 tablet by mouth daily  -     rizatriptan (MAXALT) 10 MG tablet; Take by mouth          Subjective:     Patient ID: Dusty Evans is a 41 y o  female  HPI   Rickey Mcconnell is a pleasant 62-year-old female with a history for breast cancer status post bilateral breast reconstruction with tissue expander removal and implant exchange approximately 3 and half years ago  She denies any complaints regarding her breasts bilaterally  She continues to do displacement exercises  She is quite pleased with her results  Review of Systems   Constitutional: Negative for fatigue  HENT: Negative for hearing loss  Eyes: Negative for visual disturbance  Musculoskeletal: Negative for gait problem and neck pain  Skin:        As per HPI  Neurological: Negative for tremors  Hematological: Negative for adenopathy  Psychiatric/Behavioral: Negative for agitation and decreased concentration           Objective:     Physical Exam   Constitutional: She appears well-developed and well-nourished  HENT:   Head: Normocephalic and atraumatic  Eyes: Conjunctivae and EOM are normal  Pupils are equal, round, and reactive to light  No scleral icterus  Neck: Neck supple  No tracheal deviation present  No thyromegaly present  Pulmonary/Chest: Effort normal  No respiratory distress  Lymphadenopathy:     She has no cervical adenopathy  Skin:   Breasts are soft and supple bilaterally without evidence for capsular contracture  Photos were taken  Psychiatric: She has a normal mood and affect   Her behavior is normal

## 2018-04-06 NOTE — LETTER
April 6, 2018     Janelle Wise MD  [address] [place] [address]    Patient: Xavier Narvaez   YOB: 1970   Date of Visit: 4/6/2018       Dear Dr Jillian Govea:    Thank you for referring Xavier Narvaez to me for evaluation  Below are my notes for this consultation  If you have questions, please do not hesitate to call me  I look forward to following your patient along with you           Sincerely,        Neel Jack PA-C        CC: Sosa Goncalves MD

## 2018-04-20 DIAGNOSIS — Z15.02 GENETIC SUSCEPTIBILITY TO MALIGNANT NEOPLASM OF OVARY: ICD-10-CM

## 2018-04-20 DIAGNOSIS — Z15.01 GENETIC SUSCEPTIBILITY TO MALIGNANT NEOPLASM OF BREAST: ICD-10-CM

## 2018-05-07 ENCOUNTER — OFFICE VISIT (OUTPATIENT)
Dept: GYNECOLOGIC ONCOLOGY | Facility: CLINIC | Age: 48
End: 2018-05-07
Payer: COMMERCIAL

## 2018-05-07 VITALS
BODY MASS INDEX: 22.43 KG/M2 | TEMPERATURE: 98.1 F | WEIGHT: 131.4 LBS | HEART RATE: 80 BPM | HEIGHT: 64 IN | DIASTOLIC BLOOD PRESSURE: 78 MMHG | SYSTOLIC BLOOD PRESSURE: 108 MMHG | RESPIRATION RATE: 16 BRPM

## 2018-05-07 DIAGNOSIS — Z15.09 BRCA2 GENE MUTATION POSITIVE: Primary | ICD-10-CM

## 2018-05-07 DIAGNOSIS — N95.1 SYMPTOMATIC MENOPAUSAL OR FEMALE CLIMACTERIC STATES: ICD-10-CM

## 2018-05-07 DIAGNOSIS — Z15.01 BRCA2 GENE MUTATION POSITIVE: Primary | ICD-10-CM

## 2018-05-07 PROBLEM — D05.11 DUCTAL CARCINOMA IN SITU (DCIS) OF RIGHT BREAST: Status: ACTIVE | Noted: 2018-05-07

## 2018-05-07 PROCEDURE — 99214 OFFICE O/P EST MOD 30 MIN: CPT | Performed by: PHYSICIAN ASSISTANT

## 2018-05-07 NOTE — PROGRESS NOTES
Assessment/Plan:    Problem List Items Addressed This Visit        Other    BRCA2 gene mutation positive - Primary     Benign clinical exam   ordered  Return to the office in 1 year for continued surveillance  Relevant Orders        Symptomatic menopausal or female climacteric states     Well controlled with oral premarin  CHIEF COMPLAINT:   BRCA surveillance    Problem:  BRCA 2 mutation  Ductal carcinoma insitu, right breast (ER/MI positive)    Previous therapy:  1  Robotic-assisted total laparoscopic hysterectomy and bilateral salpingo-oophrectomy on December 8, 2011  A  No evidence of malignancy  2  Bilateral, prophylactic mastectomy on January 14, 2014  A  Ductal carcinoma in situ identified in right breast  ER/MI positive  3  Expanders removed, implants placed (managed by Dr Higinio Davison)  Patient ID: Boni Rojas is a 41 y o  female  who has no new complaints today  No vaginal bleeding, abdominal/pelvic pain  Normal bowel and bladder function  No interval change in medical history since last visit  Quality of life is good  The following portions of the patient's history were reviewed and updated as appropriate: allergies, current medications, past medical history, past surgical history and problem list     Review of Systems   Constitutional: Negative  HENT: Negative  Eyes: Negative  Respiratory: Negative  Cardiovascular: Negative  Gastrointestinal: Negative  Genitourinary: Negative  Musculoskeletal: Negative  Skin: Negative  Neurological: Negative  Psychiatric/Behavioral: Negative          Current Outpatient Prescriptions   Medication Sig Dispense Refill    aspirin-acetaminophen-caffeine (EXCEDRIN MIGRAINE) 250-250-65 MG per tablet Take 1 tablet by mouth 3 (three) times a day as needed      Azelastine-Fluticasone (DYMISTA) 137-50 MCG/ACT SUSP into each nostril Twice daily      conjugated estrogens (PREMARIN) 0 625 mg tablet Take 1 tablet by mouth daily      methimazole (TAPAZOLE) 10 mg tablet Take 0 5 tablets by mouth daily      rizatriptan (MAXALT) 10 MG tablet Take by mouth      saccharomyces boulardii (FLORASTOR) 250 mg capsule Take by mouth      venlafaxine (EFFEXOR-XR) 37 5 mg 24 hr capsule Take 1 capsule (37 5 mg total) by mouth daily for 90 days 90 capsule 3     No current facility-administered medications for this visit  Objective:    Blood pressure 108/78, pulse 80, temperature 98 1 °F (36 7 °C), temperature source Oral, resp  rate 16, height 5' 4" (1 626 m), weight 59 6 kg (131 lb 6 4 oz)  Body mass index is 22 55 kg/m²  Body surface area is 1 64 meters squared  Physical Exam   Constitutional: She is oriented to person, place, and time  She appears well-developed and well-nourished  HENT:   Head: Normocephalic and atraumatic  Neck: Normal range of motion  Neck supple  No thyromegaly present  Pulmonary/Chest: Effort normal    Abdominal: Soft  She exhibits no distension and no mass  There is no rebound  Genitourinary:   Genitourinary Comments: The external female genitalia is normal  The bartholin's, uretheral and skenes glands are normal  The urethral meatus is normal  Speculum exam reveals a grossly normal vagina  No masses, lesions or bleeding  Manual exam notes a surgical absent cervix, uterus and adnexal structures  No masses or fullness  Musculoskeletal: Normal range of motion  She exhibits no edema  Lymphadenopathy:     She has no cervical adenopathy  Neurological: She is alert and oriented to person, place, and time  Skin: Skin is warm and dry  No rash noted  Psychiatric: She has a normal mood and affect  Her behavior is normal    Vitals reviewed

## 2018-05-08 PROBLEM — C43.71: Status: ACTIVE | Noted: 2018-05-08

## 2018-05-08 PROBLEM — M79.18 MYOFASCIAL PAIN SYNDROME: Status: ACTIVE | Noted: 2017-03-29

## 2018-05-08 PROBLEM — I89.0 LYMPHEDEMA OF EXTREMITY: Status: ACTIVE | Noted: 2017-02-28

## 2018-05-08 PROBLEM — D05.10 DUCTAL CARCINOMA IN SITU (DCIS) OF BREAST: Status: ACTIVE | Noted: 2017-05-04

## 2018-05-08 PROBLEM — J06.9 URI (UPPER RESPIRATORY INFECTION): Status: ACTIVE | Noted: 2018-01-08

## 2018-05-17 ENCOUNTER — OFFICE VISIT (OUTPATIENT)
Dept: SURGICAL ONCOLOGY | Facility: CLINIC | Age: 48
End: 2018-05-17
Payer: COMMERCIAL

## 2018-05-17 VITALS
TEMPERATURE: 97.8 F | HEART RATE: 78 BPM | HEIGHT: 64 IN | DIASTOLIC BLOOD PRESSURE: 70 MMHG | WEIGHT: 132.5 LBS | BODY MASS INDEX: 22.62 KG/M2 | RESPIRATION RATE: 16 BRPM | SYSTOLIC BLOOD PRESSURE: 100 MMHG

## 2018-05-17 DIAGNOSIS — D05.11 DUCTAL CARCINOMA IN SITU (DCIS) OF RIGHT BREAST: ICD-10-CM

## 2018-05-17 DIAGNOSIS — Z15.01 BRCA2 GENE MUTATION POSITIVE: Primary | ICD-10-CM

## 2018-05-17 DIAGNOSIS — Z15.09 BRCA2 GENE MUTATION POSITIVE: Primary | ICD-10-CM

## 2018-05-17 PROCEDURE — 99214 OFFICE O/P EST MOD 30 MIN: CPT | Performed by: SURGERY

## 2018-05-17 NOTE — PROGRESS NOTES
Surgical Oncology Consult Note       [facility]  [facility] [address]  1970  [id number]  [address]  [facility] [address] [address]      Chief Complaint:     Chief Complaint   Patient presents with    Breast Cancer     6 month follow up       Assessment and Plan:   Assessment/Plan   Past history of melanoma of the right leg as well as breast cancer and BRCA 2 mutation  Oncology History:        Ductal carcinoma in situ (DCIS) of right breast    2011 Genetic Testing     BRCA 2 positive  [facility]         2014 Surgery     Bilateral mastectomy  Left prophylactic mastectomy  Intraductal papillomas    Right mastectomy  DCIS  8 mm    CA 70  Stage 0              History of Present Illness:   Diagnosis and Stagin: Right thigh melanoma, stage III (T4, N1, MX), Victor Hugo's level III, 4 7 mm deep, Lymph nodes : BRCA 2 [id number]: Ductal carcinoma in situ   Treatment History: : Wide local excision, sentinel lymph node biopsy followed by groin [id number]: Interferon [id number]: ЕКАТЕРИНА/BSO (Dr Cummins Lips: Bilateral mastectomy with reconstruction     Patient has no complaints referable to her breast or to her right leg  Review of Systems:   Review of Systems   Constitutional: Negative for activity change, appetite change, fatigue and unexpected weight change  HENT: Negative for ear pain, tinnitus, trouble swallowing and voice change  Eyes: Negative for pain and visual disturbance  Respiratory: Negative for cough, shortness of breath, wheezing and stridor  Cardiovascular: Negative for chest pain, palpitations and leg swelling  Gastrointestinal: Negative for abdominal distention, abdominal pain and blood in stool  Endocrine: Negative for cold intolerance and heat intolerance     Genitourinary: Negative for difficulty urinating, dysuria, flank pain and hematuria  Musculoskeletal: Negative for arthralgias, back pain, gait problem and joint swelling  Skin: Negative for color change, rash and wound  Allergic/Immunologic: Negative for immunocompromised state  Neurological: Negative for dizziness, seizures, speech difficulty, weakness and headaches  Hematological: Negative for adenopathy  Psychiatric/Behavioral: Negative for confusion  Past Medical History:      Patient Active Problem List   Diagnosis    BRCA2 gene mutation positive    Ductal carcinoma in situ (DCIS) of right breast    Menopausal symptom    Anxiety    History of breast reconstruction    Depression    Esophageal reflux    Lymphedema of extremity    Migraine headache    Multiple thyroid nodules    Myofascial pain syndrome    Toxic multinodular goiter    Toxic nodular goiter w/o crisis    URI (upper respiratory infection)    Melanoma of thigh, right (HCC)        Past Medical History:   Diagnosis Date    Anxiety and depression     [facility] ([facility] )     Melanoma ([facility] )         Past Surgical History:   Procedure Laterality Date    HYSTERECTOMY      KNEE SURGERY      MASTECTOMY      OOPHORECTOMY      PELVIC LAPAROSCOPY          Family History   Problem Relation Age of Onset    Breast cancer Mother     Breast cancer Maternal Aunt         Social History     Social History    Marital status: Single     Spouse name: N/A    Number of children: N/A    Years of education: N/A     Occupational History    Not on file       Social History Main Topics    Smoking status: Never Smoker    Smokeless tobacco: Never Used    Alcohol use Yes    Drug use: No    Sexual activity: Not on file     Other Topics Concern    Not on file     Social History Narrative    No narrative on file        Current Outpatient Prescriptions:     aspirin-acetaminophen-caffeine ([address] [place] [address]) 250-250-65 MG per tablet, Take 1 tablet by mouth 3 (three) times a day as needed, Disp: , Rfl:     Azelastine-Fluticasone (DYMISTA) 137-50 MCG/ACT SUSP, into each nostril Twice daily, Disp: , Rfl:     conjugated estrogens (PREMARIN) 0 625 mg tablet, Take 1 tablet by mouth daily, Disp: , Rfl:     methimazole (TAPAZOLE) 10 mg tablet, Take 0 5 tablets by mouth daily, Disp: , Rfl:     rizatriptan (MAXALT) 10 MG tablet, Take by mouth, Disp: , Rfl:     saccharomyces boulardii (FLORASTOR) 250 mg capsule, Take by mouth, Disp: , Rfl:     venlafaxine (EFFEXOR-XR) 37 5 mg 24 hr capsule, Take 1 capsule (37 5 mg total) by mouth daily for 90 days, Disp: 90 capsule, Rfl: 3     Allergies   Allergen Reactions    Cat Hair Extract Sneezing    Dust Mite Extract Sneezing    Molds & Smuts Sneezing    Morphine Nausea Only, Vomiting and Drowsiness       Physical Exam:     Vitals:    05/17/18 0801   BP: 100/70   Pulse: 78   Resp: 16   Temp: 97 8 °F (36 6 °C)     Physical Exam   Pulmonary/Chest:   Examination of both reconstructed breast demonstrate no abnormal skin findings, there are no dominant masses no axillary adenopathy  Skin:   There is no evidence of local regional recurrence of her melanoma in the right thigh       Results:   Pathology:    Imaging      Discussion/Summary:   Patient is free of any clinical evidence of breast cancer or melanoma  We will see her back in 1 year's time  She is agreeable to seeing our advanced practitioner  She may relocate outside of the area and will contact us for referrals if she does  Advance Care Planning/Advance Directives:  I discussed the disease status, treatment plans and follow-up with the patient

## 2018-05-17 NOTE — LETTER
May 17, 2018     Adriel Marquez MD  [address] [address]    Patient: Dennis Stanley   YOB: 1970   Date of Visit: 2018       Dear Dr Barb Alvarez:    Thank you for referring Dennis Stanley to me for evaluation  Below are my notes for this consultation  If you have questions, please do not hesitate to call me  I look forward to following your patient along with you  Sincerely,        Uma Abad MD        CC: No Recipients  Uma Abad MD  2018  8:20 AM  Sign at close encounter               Surgical Oncology Consult Note       [address] [facility]  1970  [id number]  [address] [facility],[address]  [facility] [address] [address]      Chief Complaint:     Chief Complaint   Patient presents with    Breast Cancer     6 month follow up       Assessment and Plan:   Assessment/Plan   Past history of melanoma of the right leg as well as breast cancer and BRCA 2 mutation  Oncology History:        Ductal carcinoma in situ (DCIS) of right breast    2011 Genetic Testing     BRCA 2 positive  [facility]         2014 Surgery     Bilateral mastectomy  Left prophylactic mastectomy  Intraductal papillomas    Right mastectomy  DCIS  8 mm    KS 70  Stage 0              History of Present Illness:   Diagnosis and Stagin: Right thigh melanoma, stage III (T4, N1, MX), Victor Hugo's level III, 4 7 mm deep, Lymph nodes : BRCA 2 [id number]: Ductal carcinoma in situ   Treatment History: : Wide local excision, sentinel lymph node biopsy followed by groin [id number]: Interferon [id number]: ЕКАТЕРИНА/BSO (Dr Lakisha Ely: Bilateral mastectomy with reconstruction     Patient has no complaints referable to her breast or to her right leg      Review of Systems:   Review of Systems   Constitutional: Negative for activity change, appetite change, fatigue and unexpected weight change  HENT: Negative for ear pain, tinnitus, trouble swallowing and voice change  Eyes: Negative for pain and visual disturbance  Respiratory: Negative for cough, shortness of breath, wheezing and stridor  Cardiovascular: Negative for chest pain, palpitations and leg swelling  Gastrointestinal: Negative for abdominal distention, abdominal pain and blood in stool  Endocrine: Negative for cold intolerance and heat intolerance  Genitourinary: Negative for difficulty urinating, dysuria, flank pain and hematuria  Musculoskeletal: Negative for arthralgias, back pain, gait problem and joint swelling  Skin: Negative for color change, rash and wound  Allergic/Immunologic: Negative for immunocompromised state  Neurological: Negative for dizziness, seizures, speech difficulty, weakness and headaches  Hematological: Negative for adenopathy  Psychiatric/Behavioral: Negative for confusion         Past Medical History:      Patient Active Problem List   Diagnosis    BRCA2 gene mutation positive    Ductal carcinoma in situ (DCIS) of right breast    Menopausal symptom    Anxiety    History of breast reconstruction    Depression    Esophageal reflux    Lymphedema of extremity    Migraine headache    Multiple thyroid nodules    Myofascial pain syndrome    Toxic multinodular goiter    Toxic nodular goiter w/o crisis    URI (upper respiratory infection)    Melanoma of thigh, right (HCC)        Past Medical History:   Diagnosis Date    Anxiety and depression     [facility] [address] )     Melanoma ([facility] [address] )         Past Surgical History:   Procedure Laterality Date    HYSTERECTOMY      KNEE SURGERY      MASTECTOMY      OOPHORECTOMY      PELVIC LAPAROSCOPY          Family History   Problem Relation Age of Onset    Breast cancer Mother     Breast cancer Maternal Aunt         Social History     Social History    Marital status: Single     Spouse name: N/A    Number of children: N/A    Years of education: N/A     Occupational History    Not on file  Social History Main Topics    Smoking status: Never Smoker    Smokeless tobacco: Never Used    Alcohol use Yes    Drug use: No    Sexual activity: Not on file     Other Topics Concern    Not on file     Social History Narrative    No narrative on file        Current Outpatient Prescriptions:     aspirin-acetaminophen-caffeine (Jodine Laura) 250-250-65 MG per tablet, Take 1 tablet by mouth 3 (three) times a day as needed, Disp: , Rfl:     Azelastine-Fluticasone (DYMISTA) 137-50 MCG/ACT SUSP, into each nostril Twice daily, Disp: , Rfl:     conjugated estrogens (PREMARIN) 0 625 mg tablet, Take 1 tablet by mouth daily, Disp: , Rfl:     methimazole (TAPAZOLE) 10 mg tablet, Take 0 5 tablets by mouth daily, Disp: , Rfl:     rizatriptan (MAXALT) 10 MG tablet, Take by mouth, Disp: , Rfl:     saccharomyces boulardii (FLORASTOR) 250 mg capsule, Take by mouth, Disp: , Rfl:     venlafaxine (EFFEXOR-XR) 37 5 mg 24 hr capsule, Take 1 capsule (37 5 mg total) by mouth daily for 90 days, Disp: 90 capsule, Rfl: 3     Allergies   Allergen Reactions    Cat Hair Extract Sneezing    Dust Mite Extract Sneezing    Molds & Smuts Sneezing    Morphine Nausea Only, Vomiting and Drowsiness       Physical Exam:     Vitals:    05/17/18 0801   BP: 100/70   Pulse: 78   Resp: 16   Temp: 97 8 °F (36 6 °C)     Physical Exam   Pulmonary/Chest:   Examination of both reconstructed breast demonstrate no abnormal skin findings, there are no dominant masses no axillary adenopathy  Skin:   There is no evidence of local regional recurrence of her melanoma in the right thigh       Results:   Pathology:    Imaging      Discussion/Summary:   Patient is free of any clinical evidence of breast cancer or melanoma  We will see her back in 1 year's time  She is agreeable to seeing our advanced practitioner    She may relocate outside of the area and will contact us for referrals if she does  Advance Care Planning/Advance Directives:  I discussed the disease status, treatment plans and follow-up with the patient

## 2018-05-29 ENCOUNTER — TRANSCRIBE ORDERS (OUTPATIENT)
Dept: ADMINISTRATIVE | Facility: HOSPITAL | Age: 48
End: 2018-05-29

## 2018-05-29 DIAGNOSIS — E04.2 NONTOXIC MULTINODULAR GOITER: Primary | ICD-10-CM

## 2018-06-05 ENCOUNTER — HOSPITAL ENCOUNTER (OUTPATIENT)
Dept: RADIOLOGY | Facility: HOSPITAL | Age: 48
Discharge: HOME | End: 2018-06-05
Payer: COMMERCIAL

## 2018-06-05 DIAGNOSIS — E04.2 NONTOXIC MULTINODULAR GOITER: ICD-10-CM

## 2018-06-05 PROCEDURE — 76536 US EXAM OF HEAD AND NECK: CPT

## 2018-07-23 DIAGNOSIS — N95.1 SYMPTOMATIC MENOPAUSAL OR FEMALE CLIMACTERIC STATES: Primary | ICD-10-CM

## 2018-08-31 ENCOUNTER — TELEPHONE (OUTPATIENT)
Dept: FAMILY MEDICINE CLINIC | Facility: CLINIC | Age: 48
End: 2018-08-31

## 2018-08-31 NOTE — TELEPHONE ENCOUNTER
Pt called says she has had a really bad cold and issues with her sinuses but that went away and has gotten better   Now its all in her chest and she is congested and has a cough  Is there something that can be prescribed for patient or would you want her to be seen  She is asking if an inhaler would help  Please advise   Patient would like a return call [phone] ( [address])

## 2018-08-31 NOTE — TELEPHONE ENCOUNTER
She can do Mucinex DM 1200mg twice a day OTC and intranasal saline  But she'd have to be seen to know if she needs an inhaler or not

## 2018-09-05 DIAGNOSIS — G43.709 CHRONIC MIGRAINE WITHOUT AURA WITHOUT STATUS MIGRAINOSUS, NOT INTRACTABLE: Primary | ICD-10-CM

## 2018-09-06 RX ORDER — RIZATRIPTAN BENZOATE 10 MG/1
TABLET ORAL
Qty: 27 TABLET | Refills: 3 | Status: SHIPPED | OUTPATIENT
Start: 2018-09-06 | End: 2018-12-12 | Stop reason: SDUPTHER

## 2018-09-07 ENCOUNTER — APPOINTMENT (OUTPATIENT)
Dept: URGENT CARE | Facility: CLINIC | Age: 48
End: 2018-09-07

## 2018-09-07 DIAGNOSIS — Z02.1 DRUG TESTING, PRE-EMPLOYMENT: Primary | ICD-10-CM

## 2018-09-07 PROCEDURE — 86480 TB TEST CELL IMMUN MEASURE: CPT | Performed by: PHYSICIAN ASSISTANT

## 2018-09-07 PROCEDURE — 86787 VARICELLA-ZOSTER ANTIBODY: CPT | Performed by: PHYSICIAN ASSISTANT

## 2018-09-10 LAB
QUANTIFERON-TB GOLD IN TUBE: NORMAL
VZV IGG SER IA-ACNC: NORMAL

## 2018-09-21 LAB — CANCER AG125 SERPL-ACNC: 8 U/ML

## 2018-10-23 DIAGNOSIS — N95.1 SYMPTOMATIC MENOPAUSAL OR FEMALE CLIMACTERIC STATES: ICD-10-CM

## 2018-12-12 DIAGNOSIS — G43.709 CHRONIC MIGRAINE WITHOUT AURA WITHOUT STATUS MIGRAINOSUS, NOT INTRACTABLE: ICD-10-CM

## 2018-12-12 DIAGNOSIS — N95.1 MENOPAUSAL SYMPTOMS: ICD-10-CM

## 2018-12-12 RX ORDER — RIZATRIPTAN BENZOATE 10 MG/1
TABLET ORAL
Qty: 27 TABLET | Refills: 1 | Status: SHIPPED | OUTPATIENT
Start: 2018-12-12 | End: 2019-05-31 | Stop reason: SDUPTHER

## 2018-12-12 RX ORDER — VENLAFAXINE HYDROCHLORIDE 37.5 MG/1
37.5 CAPSULE, EXTENDED RELEASE ORAL DAILY
Qty: 90 CAPSULE | Refills: 1 | Status: SHIPPED | OUTPATIENT
Start: 2018-12-12 | End: 2019-06-05 | Stop reason: SDUPTHER

## 2019-01-11 ENCOUNTER — OFFICE VISIT (OUTPATIENT)
Dept: FAMILY MEDICINE CLINIC | Facility: CLINIC | Age: 49
End: 2019-01-11
Payer: COMMERCIAL

## 2019-01-11 VITALS
TEMPERATURE: 98.1 F | BODY MASS INDEX: 22.66 KG/M2 | SYSTOLIC BLOOD PRESSURE: 102 MMHG | DIASTOLIC BLOOD PRESSURE: 70 MMHG | HEIGHT: 65 IN | WEIGHT: 136 LBS | HEART RATE: 78 BPM | RESPIRATION RATE: 16 BRPM

## 2019-01-11 DIAGNOSIS — R07.2 SUBSTERNAL CHEST PAIN: Primary | ICD-10-CM

## 2019-01-11 DIAGNOSIS — R10.13 EPIGASTRIC PAIN: ICD-10-CM

## 2019-01-11 DIAGNOSIS — D05.11 DUCTAL CARCINOMA IN SITU (DCIS) OF RIGHT BREAST: ICD-10-CM

## 2019-01-11 PROCEDURE — 99214 OFFICE O/P EST MOD 30 MIN: CPT | Performed by: FAMILY MEDICINE

## 2019-01-11 PROCEDURE — 3008F BODY MASS INDEX DOCD: CPT | Performed by: FAMILY MEDICINE

## 2019-01-11 RX ORDER — AZELASTINE 1 MG/ML
1 SPRAY, METERED NASAL 2 TIMES DAILY
COMMUNITY

## 2019-01-11 RX ORDER — OMEPRAZOLE 20 MG/1
20 CAPSULE, DELAYED RELEASE ORAL DAILY
Qty: 30 CAPSULE | Refills: 1 | Status: SHIPPED | OUTPATIENT
Start: 2019-01-11 | End: 2019-05-13 | Stop reason: ALTCHOICE

## 2019-01-11 RX ORDER — FLUTICASONE PROPIONATE 50 MCG
1 SPRAY, SUSPENSION (ML) NASAL DAILY
COMMUNITY

## 2019-01-11 NOTE — PROGRESS NOTES
Assessment/Plan:     Diagnoses and all orders for this visit:    Substernal chest pain  -     XR chest pa & lateral; Future    Epigastric pain  -     US abdomen complete; Future  -     omeprazole (PriLOSEC) 20 mg delayed release capsule; Take 1 capsule (20 mg total) by mouth daily for 30 days    Ductal carcinoma in situ (DCIS) of right breast  -     CBC and differential  -     Comprehensive metabolic panel    Other orders  -     azelastine (ASTELIN) 0 1 % nasal spray; 1 spray into each nostril 2 (two) times a day Use in each nostril as directed  -     fluticasone (FLONASE) 50 mcg/act nasal spray; 1 spray into each nostril daily        Trial of Omeprazole 20 mg daily  Wean off Excedrin  Abdominal ultrasound  Chest x-ray  Labs  Follow up once results are back  Patient is scheduled to see GI March 2019  Advised to call if any changes     Patient ID: Lynn Cordova is a 57 y o  female  Recurrent non exertional substernal chest pain worse after eating  Excessive burping  No aggravating foods  No reflux  No dysphagia  No night time symptoms  On one Excedrin daily  No NSAIDs  No improvement with OTC Pepcid and Prevacid 15 mg daily  The following portions of the patient's history were reviewed and updated as appropriate: allergies, current medications, past family history, past medical history, past social history, past surgical history and problem list     Review of Systems   Constitutional: Positive for unexpected weight change (5 lb weight gain 05/2018)  Negative for appetite change, chills, fatigue and fever  HENT: Negative for congestion, ear pain, rhinorrhea, sore throat and trouble swallowing  Eyes: Negative for visual disturbance  Respiratory: Negative for cough, shortness of breath and wheezing  Cardiovascular: Negative for palpitations and leg swelling  Gastrointestinal: Positive for abdominal pain  Negative for blood in stool, constipation, diarrhea, nausea and vomiting          See HPI  Viral gastroenteritis last month with fevers, chills nausea,vomiting and diarrhea  EGD and colonoscopy 2009   Endocrine:        06/2018 thyroid ultrasound multiple bilateral previously biopsied nodules  Genitourinary: Negative for difficulty urinating  Skin: Negative for rash  Neurological: Negative for dizziness and headaches  Hematological: Negative for adenopathy  Does not bruise/bleed easily  History of right breast CA with bilateral mastectomies left breast prophylactic mastectomy  + BRCA 2  History of melanoma right leg  Psychiatric/Behavioral: Negative for dysphoric mood and sleep disturbance  Objective:      /70   Pulse 78   Temp 98 1 °F (36 7 °C)   Resp 16   Ht 5' 4 5" (1 638 m)   Wt 61 7 kg (136 lb)   BMI 22 98 kg/m²          Physical Exam   Constitutional: She is oriented to person, place, and time  She appears well-developed and well-nourished  No distress  HENT:   Mouth/Throat: Oropharynx is clear and moist and mucous membranes are normal  No oral lesions  Normal dentition  Eyes: Pupils are equal, round, and reactive to light  Conjunctivae and EOM are normal  No scleral icterus  Neck: No JVD present  Carotid bruit is not present  No tracheal deviation present  No thyroid mass and no thyromegaly present  Cardiovascular: Normal rate, regular rhythm and normal heart sounds  Exam reveals no gallop  No murmur heard  Pulmonary/Chest: Effort normal and breath sounds normal  No respiratory distress  She has no wheezes  She has no rales  She exhibits no tenderness (No sternal tenderness)  Abdominal: Soft  Bowel sounds are normal  She exhibits no distension and no mass  There is no tenderness  There is no rebound and no guarding  Musculoskeletal: She exhibits no edema  Lymphadenopathy:     She has no cervical adenopathy  Neurological: She is alert and oriented to person, place, and time  Skin: No rash noted     Psychiatric: She has a normal mood and affect  Nursing note and vitals reviewed

## 2019-01-16 ENCOUNTER — HOSPITAL ENCOUNTER (OUTPATIENT)
Dept: RADIOLOGY | Facility: HOSPITAL | Age: 49
Discharge: HOME | End: 2019-01-16
Payer: COMMERCIAL

## 2019-01-16 DIAGNOSIS — R07.2 SUBSTERNAL CHEST PAIN: ICD-10-CM

## 2019-01-16 PROCEDURE — 71046 X-RAY EXAM CHEST 2 VIEWS: CPT

## 2019-01-17 ENCOUNTER — HOSPITAL ENCOUNTER (OUTPATIENT)
Dept: ULTRASOUND IMAGING | Facility: HOSPITAL | Age: 49
Discharge: HOME | End: 2019-01-17
Payer: COMMERCIAL

## 2019-01-17 ENCOUNTER — APPOINTMENT (OUTPATIENT)
Dept: LAB | Facility: HOSPITAL | Age: 49
End: 2019-01-17
Payer: COMMERCIAL

## 2019-01-17 DIAGNOSIS — R10.13 EPIGASTRIC PAIN: ICD-10-CM

## 2019-01-17 LAB
ALBUMIN SERPL BCP-MCNC: 3.6 G/DL (ref 3.5–5)
ALP SERPL-CCNC: 73 U/L (ref 46–116)
ALT SERPL W P-5'-P-CCNC: 17 U/L (ref 12–78)
ANION GAP SERPL CALCULATED.3IONS-SCNC: 6 MMOL/L (ref 4–13)
AST SERPL W P-5'-P-CCNC: 17 U/L (ref 5–45)
BASOPHILS # BLD AUTO: 0.03 THOUSANDS/ΜL (ref 0–0.1)
BASOPHILS NFR BLD AUTO: 1 % (ref 0–1)
BILIRUB SERPL-MCNC: 0.65 MG/DL (ref 0.2–1)
BUN SERPL-MCNC: 18 MG/DL (ref 5–25)
CALCIUM SERPL-MCNC: 8.7 MG/DL (ref 8.3–10.1)
CHLORIDE SERPL-SCNC: 104 MMOL/L (ref 100–108)
CO2 SERPL-SCNC: 28 MMOL/L (ref 21–32)
CREAT SERPL-MCNC: 0.85 MG/DL (ref 0.6–1.3)
EOSINOPHIL # BLD AUTO: 0.16 THOUSAND/ΜL (ref 0–0.61)
EOSINOPHIL NFR BLD AUTO: 3 % (ref 0–6)
ERYTHROCYTE [DISTWIDTH] IN BLOOD BY AUTOMATED COUNT: 11.9 % (ref 11.6–15.1)
GFR SERPL CREATININE-BSD FRML MDRD: 82 ML/MIN/1.73SQ M
GLUCOSE P FAST SERPL-MCNC: 91 MG/DL (ref 65–99)
HCT VFR BLD AUTO: 45.3 % (ref 34.8–46.1)
HGB BLD-MCNC: 14.4 G/DL (ref 11.5–15.4)
IMM GRANULOCYTES # BLD AUTO: 0.01 THOUSAND/UL (ref 0–0.2)
IMM GRANULOCYTES NFR BLD AUTO: 0 % (ref 0–2)
LYMPHOCYTES # BLD AUTO: 1.76 THOUSANDS/ΜL (ref 0.6–4.47)
LYMPHOCYTES NFR BLD AUTO: 32 % (ref 14–44)
MCH RBC QN AUTO: 27.7 PG (ref 26.8–34.3)
MCHC RBC AUTO-ENTMCNC: 31.8 G/DL (ref 31.4–37.4)
MCV RBC AUTO: 87 FL (ref 82–98)
MONOCYTES # BLD AUTO: 0.36 THOUSAND/ΜL (ref 0.17–1.22)
MONOCYTES NFR BLD AUTO: 7 % (ref 4–12)
NEUTROPHILS # BLD AUTO: 3.23 THOUSANDS/ΜL (ref 1.85–7.62)
NEUTS SEG NFR BLD AUTO: 57 % (ref 43–75)
NRBC BLD AUTO-RTO: 0 /100 WBCS
PLATELET # BLD AUTO: 278 THOUSANDS/UL (ref 149–390)
PMV BLD AUTO: 9.3 FL (ref 8.9–12.7)
POTASSIUM SERPL-SCNC: 4.4 MMOL/L (ref 3.5–5.3)
PROT SERPL-MCNC: 7.2 G/DL (ref 6.4–8.2)
RBC # BLD AUTO: 5.19 MILLION/UL (ref 3.81–5.12)
SODIUM SERPL-SCNC: 138 MMOL/L (ref 136–145)
WBC # BLD AUTO: 5.55 THOUSAND/UL (ref 4.31–10.16)

## 2019-01-17 PROCEDURE — 80053 COMPREHEN METABOLIC PANEL: CPT | Performed by: FAMILY MEDICINE

## 2019-01-17 PROCEDURE — 76700 US EXAM ABDOM COMPLETE: CPT

## 2019-01-17 PROCEDURE — 36415 COLL VENOUS BLD VENIPUNCTURE: CPT | Performed by: FAMILY MEDICINE

## 2019-01-17 PROCEDURE — 85025 COMPLETE CBC W/AUTO DIFF WBC: CPT | Performed by: FAMILY MEDICINE

## 2019-01-22 ENCOUNTER — TELEPHONE (OUTPATIENT)
Dept: FAMILY MEDICINE CLINIC | Facility: CLINIC | Age: 49
End: 2019-01-22

## 2019-01-22 DIAGNOSIS — K76.9 LIVER LESION: Primary | ICD-10-CM

## 2019-01-22 NOTE — TELEPHONE ENCOUNTER
I called and spoke with patient re: labs, CXR and abdominal u/s  Non specific focus liver  History of breast CA and melanoma  LFTs normal  Plan schedule MRI abdomen  Procedure: Xr Chest Pa & Lateral    Result Date: 1/20/2019  Narrative: CHEST INDICATION:   R07 2: Precordial pain  COMPARISON:  Chest radiograph 1/24/2014 EXAM PERFORMED/VIEWS:  XR CHEST PA & LATERAL  The frontal view was performed utilizing dual energy radiographic technique  FINDINGS: Cardiomediastinal silhouette appears unremarkable  The lungs are clear  No pneumothorax or pleural effusion  Osseous structures appear within normal limits for patient age  Impression: No acute cardiopulmonary disease  Workstation performed: [id number]     Procedure: Us Abdomen Complete    Result Date: 1/17/2019  Narrative: ABDOMEN ULTRASOUND, COMPLETE INDICATION:   R10 13: Epigastric pain  COMPARISON: Ultrasound examination 7/17/2012 TECHNIQUE:   Real-time ultrasound of the abdomen was performed with a curvilinear transducer with both volumetric sweeps and still imaging techniques  FINDINGS: PANCREAS:  Visualized portions of the pancreas are within normal limits  AORTA AND IVC:  Visualized portions are normal for patient age  LIVER: Size:  Within normal range  The liver measures 16 cm in the midclavicular line  Contour:  Surface contour is smooth  Parenchyma: There is mild diffuse increased echogenicity with smooth echotexture, without significant beam attenuation or loss of periportal echogenicity  Most consistent with mild hepatic steatosis  Nonspecific hypoechoic focus noted in the left hepatic lobe measuring 9 x 7 x 8 mm  An additional vague hypoechoic focus is seen at the hepatic dome, which is difficult to fully characterize as this lesion is similar in echogenicity to the background parenchyma  Limited imaging of the main portal vein shows it to be patent and hepatopetal  BILIARY: The gallbladder is normal in caliber   No wall thickening or pericholecystic fluid  No stones or sludge identified  No sonographic Beal's sign  No intrahepatic biliary dilatation  CBD measures 1 mm  No choledocholithiasis  KIDNEY: Right kidney measures 10 x 4 cm  Within normal limits  Left kidney measures 11 x 5 cm  Within normal limits  SPLEEN: Measures 9 x 10 x 3 cm  Within normal limits  ASCITES:  None  Impression: Nonspecific hypoechoic focus in left hepatic lobe measuring up to 9 mm as well as a more vague hypoechoic focus at the hepatic dome  Findings may be better characterized with dedicated contrast-enhanced MR examination     Workstation performed: [id number]       Recent Results (from the past 336 hour(s))   CBC and differential    Collection Time: 01/17/19  8:22 AM   Result Value Ref Range    WBC 5 55 4 31 - 10 16 Thousand/uL    RBC 5 19 (H) 3 81 - 5 12 Million/uL    Hemoglobin 14 4 11 5 - 15 4 g/dL    Hematocrit 45 3 34 8 - 46 1 %    MCV 87 82 - 98 fL    MCH 27 7 26 8 - 34 3 pg    MCHC 31 8 31 4 - 37 4 g/dL    RDW 11 9 11 6 - 15 1 %    MPV 9 3 8 9 - 12 7 fL    Platelets 314 071 - 344 Thousands/uL    nRBC 0 /100 WBCs    Neutrophils Relative 57 43 - 75 %    Immat GRANS % 0 0 - 2 %    Lymphocytes Relative 32 14 - 44 %    Monocytes Relative 7 4 - 12 %    Eosinophils Relative 3 0 - 6 %    Basophils Relative 1 0 - 1 %    Neutrophils Absolute 3 23 1 85 - 7 62 Thousands/µL    Immature Grans Absolute 0 01 0 00 - 0 20 Thousand/uL    Lymphocytes Absolute 1 76 0 60 - 4 47 Thousands/µL    Monocytes Absolute 0 36 0 17 - 1 22 Thousand/µL    Eosinophils Absolute 0 16 0 00 - 0 61 Thousand/µL    Basophils Absolute 0 03 0 00 - 0 10 Thousands/µL   Comprehensive metabolic panel    Collection Time: 01/17/19  8:22 AM   Result Value Ref Range    Sodium 138 136 - 145 mmol/L    Potassium 4 4 3 5 - 5 3 mmol/L    Chloride 104 100 - 108 mmol/L    CO2 28 21 - 32 mmol/L    ANION GAP 6 4 - 13 mmol/L    BUN 18 5 - 25 mg/dL    Creatinine 0 85 0 60 - 1 30 mg/dL    Glucose, Fasting 91 65 - 99 mg/dL    Calcium 8 7 8 3 - 10 1 mg/dL    AST 17 5 - 45 U/L    ALT 17 12 - 78 U/L    Alkaline Phosphatase 73 46 - 116 U/L    Total Protein 7 2 6 4 - 8 2 g/dL    Albumin 3 6 3 5 - 5 0 g/dL    Total Bilirubin 0 65 0 20 - 1 00 mg/dL    eGFR 82 ml/min/1 73sq m

## 2019-01-29 ENCOUNTER — OFFICE VISIT (OUTPATIENT)
Dept: ENDOCRINOLOGY | Facility: CLINIC | Age: 49
End: 2019-01-29
Payer: COMMERCIAL

## 2019-01-29 VITALS
SYSTOLIC BLOOD PRESSURE: 122 MMHG | HEART RATE: 79 BPM | BODY MASS INDEX: 23.35 KG/M2 | WEIGHT: 136.8 LBS | DIASTOLIC BLOOD PRESSURE: 82 MMHG | HEIGHT: 64 IN

## 2019-01-29 DIAGNOSIS — E05.90 SUBCLINICAL HYPERTHYROIDISM: ICD-10-CM

## 2019-01-29 DIAGNOSIS — E05.20 TOXIC MULTINODULAR GOITER: Primary | ICD-10-CM

## 2019-01-29 PROCEDURE — 99244 OFF/OP CNSLTJ NEW/EST MOD 40: CPT | Performed by: INTERNAL MEDICINE

## 2019-01-29 RX ORDER — METHIMAZOLE 5 MG/1
TABLET ORAL
Qty: 45 TABLET | Refills: 1 | Status: SHIPPED | OUTPATIENT
Start: 2019-01-29 | End: 2019-08-28 | Stop reason: SDUPTHER

## 2019-01-29 NOTE — ASSESSMENT & PLAN NOTE
Thyroid nodules have remained stable on serial imaging-thyroid uptake and scan from 2010 showed toxic adenomas-patient has been on methimazole since then and has been tolerating without any side effects  Discussed permanent treatment with either radioactive iodine ablation versus thyroidectomy  She is interested in radioactive iodine treatment however wants to think about it

## 2019-01-29 NOTE — PROGRESS NOTES
Lorenzo Lema 57 y o  female MRN: [id number]    Encounter: [id number]      Assessment/Plan     Problem List Items Addressed This Visit     Toxic multinodular goiter - Primary     Thyroid nodules have remained stable on serial imaging-thyroid uptake and scan from 2010 showed toxic adenomas-patient has been on methimazole since then and has been tolerating without any side effects  Discussed permanent treatment with either radioactive iodine ablation versus thyroidectomy  She is interested in radioactive iodine treatment however wants to think about it  Relevant Medications    methimazole (TAPAZOLE) 5 mg tablet    Other Relevant Orders    TSH, 3rd generation Lab Collect    T4, free Lab Collect    Subclinical hyperthyroidism     Continue methimazole at current dose-will check thyroid function tests  Relevant Medications    methimazole (TAPAZOLE) 5 mg tablet    Other Relevant Orders    TSH, 3rd generation Lab Collect    T4, free Lab Collect        CC:   Thyroid dysfunction    History of Present Illness     HPI:  61-year-old woman referred here for evaluation of thyroid nodules and subclinical hyperthyroidism  Thyroid nodules discovered about 10 years back  She underwent fine-needle aspiration biopsy of bilateral nodules which was negative for malignancy  In 2013 underwent fine-needle biopsy RIGHT UPPER POLE NODULE - which showed scattered follicular cells and scant colloid-insufficient for complete cytologic evaluation    She Has been on methimazole for the past 10 years as well for subclinical hyperthyroidism- currently on methimazole 5 mg every other day   No SE  No fatigue , no weight changes ,no constipation ,occasional diarrhea   No heat/cold intolerance   Hysterectomy in 2012 - BRACA +VE     No FH of thyroid cancer , no history of RT to neck    Review of Systems   Constitutional: Negative for fatigue  HENT: Negative for trouble swallowing  Eyes: Negative for visual disturbance  Cardiovascular: Positive for palpitations  Negative for leg swelling  Gastrointestinal: Negative for constipation, diarrhea, nausea and vomiting  Endocrine: Negative for polydipsia and polyuria  Musculoskeletal: Negative for gait problem  Psychiatric/Behavioral: Negative for sleep disturbance  The patient is not nervous/anxious  All other systems reviewed and are negative        Historical Information   Past Medical History:   Diagnosis Date    Anxiety and depression     [facility]     Easy bruising     Easy bruising tendency     [facility]anoma ([facility] )      Past Surgical History:   Procedure Laterality Date    BREAST SURGERY      Reported history of breast surgery for biopsy    HYSTERECTOMY      KNEE SURGERY      MASTECTOMY      OOPHORECTOMY      PELVIC LAPAROSCOPY      SENTINEL LYMPH NODE BIOPSY      SIMPLE MASTECTOMY Bilateral     SKIN LESION EXCISION Right     Excision of lesion lower extremity      Social History   History   Alcohol Use    Yes     History   Drug Use No     History   Smoking Status    Never Smoker   Smokeless Tobacco    Never Used     Family History:   Family History   Problem Relation Age of Onset   Qar Breast cancer Mother     Breast cancer Maternal Aunt     Thyroid disease Maternal Aunt     Liver cancer Father     Kidney cancer Maternal Grandmother     Heart attack Paternal Aunt         Atrial myocardial infarction    Diabetes Paternal Aunt     Colon cancer Family     Lung cancer Family        Meds/Allergies   Current Outpatient Prescriptions   Medication Sig Dispense Refill    aspirin-acetaminophen-caffeine (EXCEDRIN MIGRAINE) 250-250-65 MG per tablet Take 1 tablet by mouth 3 (three) times a day as needed      azelastine (ASTELIN) 0 1 % nasal spray 1 spray into each nostril 2 (two) times a day Use in each nostril as directed      conjugated estrogens (PREMARIN) 0 625 mg tablet Take 1 tablet (0 625 mg total) by mouth daily 90 tablet 1    fluticasone (FLONASE) 50 mcg/act nasal spray 1 spray into each nostril daily      omeprazole (PriLOSEC) 20 mg delayed release capsule Take 1 capsule (20 mg total) by mouth daily for 30 days 30 capsule 1    rizatriptan (MAXALT) 10 MG tablet 1 tablet the onset of migraine may repeat 2nd dose after 2 hours 27 tablet 1    venlafaxine (EFFEXOR-XR) 37 5 mg 24 hr capsule Take 1 capsule (37 5 mg total) by mouth daily for 90 days 90 capsule 1    methimazole (TAPAZOLE) 5 mg tablet 1 tab every other day 45 tablet 1     No current facility-administered medications for this visit  Allergies   Allergen Reactions    Cat Hair Extract Sneezing    Dust Mite Extract Sneezing    Molds & Smuts Sneezing    Morphine Nausea Only, Vomiting and Drowsiness       Objective   Vitals: Blood pressure 122/82, pulse 79, height 5' 3 75" (1 619 m), weight 62 1 kg (136 lb 12 8 oz)  Physical Exam   Constitutional: She is oriented to person, place, and time  She appears well-developed and well-nourished  No distress  HENT:   Head: Normocephalic and atraumatic  Mouth/Throat: No oropharyngeal exudate  Eyes: Conjunctivae and EOM are normal  No scleral icterus  Neck: Normal range of motion  Neck supple  No thyromegaly present  Cardiovascular: Normal rate, regular rhythm and normal heart sounds  No murmur heard  Pulmonary/Chest: Effort normal and breath sounds normal  No respiratory distress  She has no wheezes  She has no rales  Abdominal: Soft  Bowel sounds are normal  She exhibits no distension  There is no tenderness  There is no rebound  Musculoskeletal: Normal range of motion  She exhibits no edema or deformity  Lymphadenopathy:     She has no cervical adenopathy  Neurological: She is alert and oriented to person, place, and time  Skin: Skin is warm and dry  No rash noted  No erythema  No pallor  Psychiatric: She has a normal mood and affect   Her behavior is normal  Judgment and thought content normal        The history was obtained from the review of the chart, patient  Lab Results:    sept 2018- tsh 0 8, free t4 0 9     Final pathology October 2013-right upper pole fine-needle aspiration biopsy-rare groups and few scattered single unremarkable follicular cells and very scant colloid present however insufficient material present for complete cytologic evaluation  Imaging Studies:   Results for orders placed in visit on 10/18/18   US thyroid     THYROID ULTRASOUND     INDICATION:    Follow-up nodules, history of hyperthyroidism      COMPARISON:  10/8/2015     TECHNIQUE:   Ultrasound of the thyroid was performed with a high frequency linear transducer in transverse and sagittal planes including volumetric imaging sweeps as well as traditional still imaging technique      FINDINGS:  Thyroid parenchyma is diffusely heterogeneous in echotexture  Is increased vascularity      Right lobe:  1 5 x 1 0 x 5 8 cm  Left lobe:  1 5 x 2 0 x 6 7 cm  Isthmus:  0 1 cm      Nodule #1  LEFT upper pole nodule measuring 0 8 x 0 7 x 1 2 cm  Unchanged from prior  COMPOSITION:  2 points, solid or almost completely solid   ECHOGENICITY:  1 point, hyperechoic or isoechoic  SHAPE:  0 points, wider-than-tall  MARGIN: 0 points, smooth  ECHOGENIC FOCI:  0 points, none or large comet-tail artifacts  TI-RADS Classification: TR 3 (3 points), Mildly suspicious  FNA if >2 5 cm  Follow if >1 5 cm  This nodule remains stable for greater than 5 years and has had a prior benign biopsy  If there is a high level of clinical concern, continued surveillance   at 2 year intervals could be considered, otherwise no additional workup recommended      Nodule #2  LEFT lower pole nodule measuring 1 1 x 1 2 x 1 5 cm  Unchanged from prior  COMPOSITION:  2 points, solid or almost completely solid   ECHOGENICITY:  1 point, hyperechoic or isoechoic  SHAPE:  0 points, wider-than-tall  MARGIN: 0 points, smooth    ECHOGENIC FOCI:  0 points, none or large comet-tail artifacts  TI-RADS Classification: TR 3 (3 points), Mildly suspicious  FNA if >2 5 cm  Follow if >1 5 cm  This nodule remains stable for greater than 5 years and has had a prior benign biopsy  If there is a high level of clinical concern, continued surveillance   at 2 year intervals could be considered, otherwise no additional workup recommended      Nodule #3  LEFT upper pole nodule measuring 1 2 x 1 1 x 1 5 cm  Unchanged from prior  COMPOSITION:  1 point, mixed cystic and solid  ECHOGENICITY:  2 points, hypoechoic  SHAPE:  0 points, wider-than-tall  MARGIN: 0 points, smooth  ECHOGENIC FOCI:  0 points, none or large comet-tail artifacts  TI-RADS Classification: TR 3 (3 points), Mildly suspicious  FNA if >2 5 cm  Follow if >1 5 cm  This nodule remains stable for greater than 5 years and has had a prior benign biopsy  If there is a high level of clinical concern, continued surveillance   at 2 year intervals could be considered, otherwise no additional workup recommended      Nodule #4  LEFT midgland nodule measuring 0 6 x 1 0 x 0 8 cm  Unchanged from prior  COMPOSITION:  2 points, solid or almost completely solid   ECHOGENICITY:  1 point, hyperechoic or isoechoic  SHAPE:  0 points, wider-than-tall  MARGIN: 0 points, smooth  ECHOGENIC FOCI:  0 points, none or large comet-tail artifacts  TI-RADS Classification: TR 3 (3 points), Mildly suspicious  FNA if >2 5 cm  Follow if >1 5 cm  This nodule remains stable for greater than 5 years and has had a prior benign biopsy  If there is a high level of clinical concern, continued surveillance   at 2 year intervals could be considered, otherwise no additional workup recommended      IMPRESSION:     Multiple bilateral previously biopsied nodules   If there is a high level of clinical concern, continued surveillance at 2 year intervals could be considered, otherwise no additional workup recommended      Reference: ACR Thyroid Imaging, Reporting and Data System (TI-RADS): White Paper of the [employer]  J AM Seng Radiol 8185;53:[id number]  (additional recommendations based on American Thyroid Association 2015 guidelines )        Workstation performed: [id number]      Thyroid uptake and scan August 2010-hot nodules in the upper to midpole left gland and lower pole left gland which is suppressing the right lobe activity-increased 7 hr uptake at 26% with slightly elevated 24 hr uptake at 35%  I have personally reviewed pertinent reports  Portions of the record may have been created with voice recognition software  Occasional wrong word or "sound a like" substitutions may have occurred due to the inherent limitations of voice recognition software  Read the chart carefully and recognize, using context, where substitutions have occurred

## 2019-01-29 NOTE — PATIENT INSTRUCTIONS
Thyroid Nodules   WHAT YOU NEED TO KNOW:   What are thyroid nodules? Thyroid nodules are growths on your thyroid gland  Your thyroid makes hormones that help control your body temperature, heart rate, and growth  The hormones also control how fast your body uses food for energy  Some nodules are lumps of tissue, and others are filled with fluid  What increases my risk for thyroid nodules? A lack of iodine in the foods you eat is the most common cause of thyroid nodules  The following may increase your risk:  · Autoimmune thyroid disorders, such as Hashimoto disease    · Medical conditions, such as cancer, a thyroid infection, thyroid goiter, or a thyroid cyst    · A family history  of thyroid nodules or thyroid cancer    · Pregnancy  that causes your body to create more hormones    · Past radiation  treatment to your head or neck  What are the signs and symptoms of thyroid nodules? A small nodule may have no signs or symptoms  As your nodule grows, you may be able to see a lump on your neck  A large nodule may press on your airway or neck veins and cause the following:  · A cough or choking and hoarse voice    · Flushed face and swollen neck or neck veins    · Noisy, high-pitched breathing    · Pain when you swallow or trouble swallowing    · Trouble breathing when you lie down  How are thyroid nodules diagnosed? · Blood tests  are done to check the level of thyroid hormones in your body  A blood test may also show if you have an autoimmune disease that caused your nodules  · An ultrasound  uses sound waves to show pictures of your thyroid on a screen  · A fine-needle biopsy  is done to get a tissue sample from your thyroid gland to be tested  How are thyroid nodules treated? · Thyroid medicine  is given to bring your thyroid hormone levels back to a normal range  · Radioactive iodine  is given to damage cells in your thyroid gland and decrease the size of your nodules       · Laser ablation  is done to make your nodules smaller  Ask for more information about laser ablation  · Surgery  may be done to remove all or part of your thyroid gland  Surgery is done if your nodules are cancerous  Ask for more information about thyroid surgery  What can I do to manage my thyroid nodules? · Eat iodine-rich foods  Examples include fish, seaweed, dairy products, eggs, beans, and lean meat  Iodized salt also contains iodine  You may need to use iodized table salt when you cook and season your food  Iodine may be added to bread or to your drinking water  Ask for a list of foods that contain iodine, and ask how much iodine you need each day  · Go to follow-up appointments  Write down your questions so you remember to ask them during your visits  When should I contact my healthcare provider? · You have a new cough that does not improve  · You begin choking or have new or increased trouble swallowing  · Your voice becomes hoarse  · You are losing weight without trying  · You have questions or concerns about your condition or care  When should I seek immediate care or call 911? · You have sudden chest pain or trouble breathing  · Your symptoms worsen, even after you take your medicines  CARE AGREEMENT:   You have the right to help plan your care  Learn about your health condition and how it may be treated  Discuss treatment options with your caregivers to decide what care you want to receive  You always have the right to refuse treatment  The above information is an  only  It is not intended as medical advice for individual conditions or treatments  Talk to your doctor, nurse or pharmacist before following any medical regimen to see if it is safe and effective for you  © 2017 [employer] Information is for End User's use only and may not be sold, redistributed or otherwise used for commercial purposes   All illustrations and images included in CareNotes® are the copyrighted property of [employer]  or Sudhir Marley  Hyperthyroidism   WHAT YOU NEED TO KNOW:   What is hyperthyroidism? Hyperthyroidism is a condition that develops when your thyroid hormone levels are high  Thyroid hormones help control body temperature, heart rate, growth, and weight  What causes hyperthyroidism? If you have a family member with hyperthyroidism, your risk is increased  Any of the following can cause hyperthyroidism:  · Autoimmune disease, such as Graves disease or Micaela disease    · Certain medicines, such as lithium, amiodarone, or aspirin    · Viral infection    · Thyroid inflammation or thyroid cancer    · High iodine levels  What are the signs and symptoms of hyperthyroidism? The signs and symptoms may develop slowly, sometimes over several years  · Weight loss, increased appetite, diarrhea, or constipation    · Increased sweating and heat intolerance    · Nervousness, restlessness, tremors, and difficulty sleeping    · Fast heart rate and fast breathing, even at rest    · Painful lump in your neck or bulging eyes    · Fatigue and muscle weakness    · Decreased or absent monthly periods  What is a thyroid storm? Thyroid storm happens if your thyroid hormone levels get too high  Your temperature may go very high, your heart may beat very fast, and you may have problems thinking  You may have increased sweating, vomiting, or diarrhea  You may have seizures or go into a coma  Thyroid storm may happen if you have hyperthyroidism and get an infection or stop taking your thyroid medicine  Injuries, burns, and certain medicines can also cause a thyroid storm  How is hyperthyroidism diagnosed? Your healthcare provider will ask about your symptoms and what medicines you take  He will ask about your medical history and if anyone in your family has thyroid disease  A blood test will show your TSH level  How is hyperthyroidism treated?   You may not need any treatment, or you may need any of the following:  · Antithyroid medicines  decrease thyroid hormone levels and your symptoms  · Radioactive iodine  is given to damage or kill some thyroid gland cells  This may decrease the amount of thyroid hormone produced  Tell your healthcare provider if you know or think you are pregnant  This medicine can be harmful to an unborn baby  · Surgery  may be done to remove all or part of the thyroid gland  Call 911 for any of the following:   · You have sudden chest pain or shortness of breath  · You have a seizure  · Your heart is beating faster than usual     · You feel like you are going to faint  When should I contact my healthcare provider? · You have a fever  · You feel nervous and restless  · You have chills, a cough, or feel weak and achy  · You run out of medicine or have stopped taking it  · You have questions or concerns about your condition or care  CARE AGREEMENT:   You have the right to help plan your care  Learn about your health condition and how it may be treated  Discuss treatment options with your caregivers to decide what care you want to receive  You always have the right to refuse treatment  The above information is an  only  It is not intended as medical advice for individual conditions or treatments  Talk to your doctor, nurse or pharmacist before following any medical regimen to see if it is safe and effective for you  © 2017 [employer] Information is for End User's use only and may not be sold, redistributed or otherwise used for commercial purposes  All illustrations and images included in CareNotes® are the copyrighted property of [employer]  or Sudhir Marley

## 2019-01-30 ENCOUNTER — LAB (OUTPATIENT)
Dept: LAB | Facility: HOSPITAL | Age: 49
End: 2019-01-30
Attending: INTERNAL MEDICINE
Payer: COMMERCIAL

## 2019-01-30 DIAGNOSIS — E05.90 SUBCLINICAL HYPERTHYROIDISM: ICD-10-CM

## 2019-01-30 DIAGNOSIS — E05.20 TOXIC MULTINODULAR GOITER: ICD-10-CM

## 2019-01-30 LAB
T4 FREE SERPL-MCNC: 0.93 NG/DL (ref 0.76–1.46)
TSH SERPL DL<=0.05 MIU/L-ACNC: 0.81 UIU/ML (ref 0.36–3.74)

## 2019-01-30 PROCEDURE — 84443 ASSAY THYROID STIM HORMONE: CPT

## 2019-01-30 PROCEDURE — 36415 COLL VENOUS BLD VENIPUNCTURE: CPT

## 2019-01-30 PROCEDURE — 84439 ASSAY OF FREE THYROXINE: CPT

## 2019-01-31 ENCOUNTER — TELEPHONE (OUTPATIENT)
Dept: ENDOCRINOLOGY | Facility: CLINIC | Age: 49
End: 2019-01-31

## 2019-01-31 ENCOUNTER — TELEPHONE (OUTPATIENT)
Dept: FAMILY MEDICINE CLINIC | Facility: CLINIC | Age: 49
End: 2019-01-31

## 2019-01-31 ENCOUNTER — HOSPITAL ENCOUNTER (OUTPATIENT)
Dept: MRI IMAGING | Facility: HOSPITAL | Age: 49
Discharge: HOME | End: 2019-01-31
Payer: COMMERCIAL

## 2019-01-31 DIAGNOSIS — K76.9 LIVER LESION: ICD-10-CM

## 2019-01-31 PROCEDURE — 74183 MRI ABD W/O CNTR FLWD CNTR: CPT

## 2019-01-31 PROCEDURE — A9585 GADOBUTROL INJECTION: HCPCS | Performed by: FAMILY MEDICINE

## 2019-01-31 RX ADMIN — GADOBUTROL 6 ML: 604.72 INJECTION INTRAVENOUS at 09:24

## 2019-01-31 NOTE — TELEPHONE ENCOUNTER
----- Message from Kelley Philip MD sent at 1/31/2019 12:51 PM EST -----  Please call the patient regarding labs - thyroid function tests normal-continue methimazole

## 2019-01-31 NOTE — TELEPHONE ENCOUNTER
Mandy Rendon from [facility] called stating patient's MRI is in chart with significant findings  Please review

## 2019-02-03 NOTE — PROGRESS NOTES
Patti, MRI abdomen shows no abnormalities  The radiologist recommended a repeat ultrasound in 3 months  Please call if you have any questions

## 2019-02-26 ENCOUNTER — TELEPHONE (OUTPATIENT)
Dept: FAMILY MEDICINE CLINIC | Facility: CLINIC | Age: 49
End: 2019-02-26

## 2019-02-26 DIAGNOSIS — R10.13 EPIGASTRIC PAIN: Primary | ICD-10-CM

## 2019-02-26 NOTE — TELEPHONE ENCOUNTER
Kelsea Rodney from Dr Conor Venegas office calling to see if you referred patient to them for chest pain? Patient said her PCP referred her   If you did can you put a referral in chart for [facility]

## 2019-03-05 ENCOUNTER — OFFICE VISIT (OUTPATIENT)
Dept: GASTROENTEROLOGY | Facility: CLINIC | Age: 49
End: 2019-03-05
Payer: COMMERCIAL

## 2019-03-05 VITALS
DIASTOLIC BLOOD PRESSURE: 87 MMHG | WEIGHT: 138 LBS | BODY MASS INDEX: 23.56 KG/M2 | TEMPERATURE: 97.5 F | SYSTOLIC BLOOD PRESSURE: 130 MMHG | HEIGHT: 64 IN | HEART RATE: 87 BPM

## 2019-03-05 DIAGNOSIS — K58.2 IRRITABLE BOWEL SYNDROME WITH BOTH CONSTIPATION AND DIARRHEA: ICD-10-CM

## 2019-03-05 DIAGNOSIS — K76.9 LIVER LESION: ICD-10-CM

## 2019-03-05 DIAGNOSIS — R10.13 EPIGASTRIC PAIN: ICD-10-CM

## 2019-03-05 DIAGNOSIS — R10.13 DYSPEPSIA: Primary | ICD-10-CM

## 2019-03-05 DIAGNOSIS — K21.9 GASTROESOPHAGEAL REFLUX DISEASE, ESOPHAGITIS PRESENCE NOT SPECIFIED: ICD-10-CM

## 2019-03-05 PROCEDURE — 99244 OFF/OP CNSLTJ NEW/EST MOD 40: CPT | Performed by: INTERNAL MEDICINE

## 2019-03-05 RX ORDER — IBUPROFEN 200 MG
200 TABLET ORAL EVERY 6 HOURS PRN
COMMUNITY
Start: 2009-02-23 | End: 2019-05-15 | Stop reason: ALTCHOICE

## 2019-03-05 NOTE — PATIENT INSTRUCTIONS
EGD/Colon scheduled 4/16/19m at [facility] with Cayden Lester instructions given in office

## 2019-03-05 NOTE — PROGRESS NOTES
[facility] - Outpatient Follow-up Note  Diamantina Aschoff 57 y o  female MRN: [id number]  Encounter: [id number]          ASSESSMENT AND PLAN:      1  Dyspepsia - likely secondary to  gastritis, esophagitis r/o PUD  She ook PPI for a month then discontinued  She reports that her symptoms are improving  We will schedule her for EGD to rule out PUD and esophagitis  2  Irritable Bowel Syndrome with both constipation and diarrhea   Pt has fine-tuned her diet by implementing some parts of the FODMAP diet  I advised her to try the FODMAP diet fully for 4-6 weeks  I also instructed her to visit the website [employer] for further FODMAP diet advice  She reports mild intermittent diarrhea with sweating  But denied cough, wheezing or flushing  Given her family history of carcinoid syndrome in her father, I recommend she get a diagnostic colonoscopy to further assessment  Risks and benefits of the procedure were discussed  Risks include but aren't limited to bleeding, perforation, missed lesion, and infection  Patient is agreeable to the procedure  Bowel prep instructions given  3  Nonspecific hypoechoic focus in left hepatic lobe measuring up to 9 mm as well as a more vague hypoechoic focus at the hepatic dome - MRI was done but the lesion was not well visualized  Repeat US recommended  Order for ultrasound placed  Follow up as needed      ______________________________________________________________________    SUBJECTIVE:  Diamantina Aschoff is a 57 y o  female here for evaluation and treatment of epigastic pain  Two months ago, she started to experience epigastric pain with associated burping, but no diarrhea  She didn't have acid taste at the time  She also describes a pain behind the lower portion of her sternum  A week ago her symptoms started to improve but she decided to come in for established care   She reports she has had intermittent GI issues for years, mainly belching, lower back pain, diarrhea, constipation, intermittent abdominal pain, and sweats  She states no particular foods are directly related to the symptoms she had but when she eats rich foods, her symptoms are aggravated  She states she started to consider some of the FODMAP-recommended foods and has been implementing some FODMAP elements into her diet  She was on Omeprazole 20 mg daily which she discontinued on 2/10/19  She has tried probiotic supplementation in the past, but discontinued it recently  She takes Lactaid pills before she eats dairy  That seems to help  She denies weight loss, regurgitation  She denies a history of gallstones  She was sent for an MRI but nothing of note was revealed  No abdominal surgeries, but she had a hysterectomy in   She is BRCA 2 gene mutation positive and has a family history of breast cancer  Her father  of carcinoid syndrome that started in his GI tract  REVIEW OF SYSTEMS IS OTHERWISE NEGATIVE        Historical Information   Past Medical History:   Diagnosis Date    Anxiety and depression     [facility] [address] [facility]     Easy bruising     Easy bruising tendency     [facility]      Past Surgical History:   Procedure Laterality Date    BREAST SURGERY      Reported history of breast surgery for biopsy    HYSTERECTOMY      KNEE SURGERY      MASTECTOMY      OOPHORECTOMY      PELVIC LAPAROSCOPY      SENTINEL LYMPH NODE BIOPSY      SIMPLE MASTECTOMY Bilateral     SKIN LESION EXCISION Right     Excision of lesion lower extremity      Social History   Social History     Substance and Sexual Activity   Alcohol Use Yes     Social History     Substance and Sexual Activity   Drug Use No     Social History     Tobacco Use   Smoking Status Never Smoker   Smokeless Tobacco Never Used     Family History   Problem Relation Age of Onset    Breast cancer Mother     Breast cancer Maternal Aunt     Thyroid disease Maternal Aunt     Liver cancer Father     Kidney cancer Maternal Grandmother     Heart attack Paternal Aunt         Atrial myocardial infarction    Diabetes Paternal Aunt     Colon cancer Family     Lung cancer Family        Meds/Allergies       Current Outpatient Medications:     aspirin-acetaminophen-caffeine (EXCEDRIN MIGRAINE) 250-250-65 MG per tablet    azelastine (ASTELIN) 0 1 % nasal spray    conjugated estrogens (PREMARIN) 0 625 mg tablet    fluticasone (FLONASE) 50 mcg/act nasal spray    methimazole (TAPAZOLE) 5 mg tablet    omeprazole (PriLOSEC) 20 mg delayed release capsule    rizatriptan (MAXALT) 10 MG tablet    venlafaxine (EFFEXOR-XR) 37 5 mg 24 hr capsule    Allergies   Allergen Reactions    Cat Hair Extract Sneezing    Dust Mite Extract Sneezing    Molds & Smuts Sneezing    Morphine Nausea Only, Vomiting and Drowsiness           Objective     There were no vitals taken for this visit  There is no height or weight on file to calculate BMI  PHYSICAL EXAM:      General Appearance:   Alert, cooperative, no distress   HEENT:   Normocephalic, atraumatic, anicteric      Neck:  Supple, symmetrical, trachea midline   Lungs:   Clear to auscultation bilaterally; no rales, rhonchi or wheezing; respirations unlabored    Heart[de-identified]   Regular rate and rhythm; no murmur, rub, or gallop  Abdomen:   Soft, non-tender, non-distended; normal bowel sounds; no masses, no organomegaly    Genitalia:   Deferred    Rectal:   Deferred    Extremities:  No cyanosis, clubbing or edema    Pulses:  2+ and symmetric    Skin:  No jaundice, rashes, or lesions    Lymph nodes:  No palpable cervical lymphadenopathy        Lab Results:   No visits with results within 1 Day(s) from this visit  Latest known visit with results is:   Lab on 01/30/2019   Component Date Value    TSH 3RD GENERATON 01/30/2019 0 813     Free T4 01/30/2019 0 93          Radiology Results:   No results found        Attestation:   By signing my name below, Hawa Cornejo, attest that this documentation has been prepared under the direction and in the presence of Kellen Elena MD  Electronically Signed: Ti Gonzales  3/5/19      I, Kellen Elena, personally performed the services described in this documentation  All medical record entries made by the shaggyibe were at my direction and in my presence  I have reviewed the chart and discharge instructions and agree that the record reflects my personal performance and is accurate and complete   Kellen Elena MD  3/5/19

## 2019-04-02 ENCOUNTER — ANESTHESIA EVENT (OUTPATIENT)
Dept: PERIOP | Facility: AMBULARY SURGERY CENTER | Age: 49
End: 2019-04-02
Payer: COMMERCIAL

## 2019-04-09 ENCOUNTER — OFFICE VISIT (OUTPATIENT)
Dept: PLASTIC SURGERY | Facility: CLINIC | Age: 49
End: 2019-04-09
Payer: COMMERCIAL

## 2019-04-09 VITALS — WEIGHT: 135.8 LBS | BODY MASS INDEX: 23.18 KG/M2 | HEIGHT: 64 IN

## 2019-04-09 DIAGNOSIS — D05.11 DUCTAL CARCINOMA IN SITU (DCIS) OF RIGHT BREAST: Primary | ICD-10-CM

## 2019-04-09 PROCEDURE — 99213 OFFICE O/P EST LOW 20 MIN: CPT | Performed by: PHYSICIAN ASSISTANT

## 2019-04-16 ENCOUNTER — HOSPITAL ENCOUNTER (OUTPATIENT)
Facility: AMBULARY SURGERY CENTER | Age: 49
Setting detail: OUTPATIENT SURGERY
Discharge: HOME | End: 2019-04-16
Attending: INTERNAL MEDICINE | Admitting: INTERNAL MEDICINE
Payer: COMMERCIAL

## 2019-04-16 ENCOUNTER — ANESTHESIA (OUTPATIENT)
Dept: PERIOP | Facility: AMBULARY SURGERY CENTER | Age: 49
End: 2019-04-16
Payer: COMMERCIAL

## 2019-04-16 VITALS
TEMPERATURE: 97 F | BODY MASS INDEX: 22.53 KG/M2 | HEIGHT: 64 IN | RESPIRATION RATE: 18 BRPM | DIASTOLIC BLOOD PRESSURE: 73 MMHG | OXYGEN SATURATION: 98 % | SYSTOLIC BLOOD PRESSURE: 114 MMHG | WEIGHT: 132 LBS | HEART RATE: 61 BPM

## 2019-04-16 DIAGNOSIS — K21.9 GASTROESOPHAGEAL REFLUX DISEASE, ESOPHAGITIS PRESENCE NOT SPECIFIED: Primary | ICD-10-CM

## 2019-04-16 DIAGNOSIS — K58.2 IRRITABLE BOWEL SYNDROME WITH BOTH CONSTIPATION AND DIARRHEA: ICD-10-CM

## 2019-04-16 PROCEDURE — 43239 EGD BIOPSY SINGLE/MULTIPLE: CPT | Performed by: INTERNAL MEDICINE

## 2019-04-16 PROCEDURE — 88342 IMHCHEM/IMCYTCHM 1ST ANTB: CPT | Performed by: PATHOLOGY

## 2019-04-16 PROCEDURE — 88305 TISSUE EXAM BY PATHOLOGIST: CPT | Performed by: PATHOLOGY

## 2019-04-16 PROCEDURE — 45380 COLONOSCOPY AND BIOPSY: CPT | Performed by: INTERNAL MEDICINE

## 2019-04-16 PROCEDURE — NC001 PR NO CHARGE: Performed by: INTERNAL MEDICINE

## 2019-04-16 RX ORDER — LIDOCAINE HYDROCHLORIDE 10 MG/ML
INJECTION, SOLUTION INFILTRATION; PERINEURAL AS NEEDED
Status: DISCONTINUED | OUTPATIENT
Start: 2019-04-16 | End: 2019-04-16 | Stop reason: SURG

## 2019-04-16 RX ORDER — PROPOFOL 10 MG/ML
INJECTION, EMULSION INTRAVENOUS AS NEEDED
Status: DISCONTINUED | OUTPATIENT
Start: 2019-04-16 | End: 2019-04-16 | Stop reason: SURG

## 2019-04-16 RX ORDER — SODIUM CHLORIDE 9 MG/ML
125 INJECTION, SOLUTION INTRAVENOUS CONTINUOUS
Status: DISCONTINUED | OUTPATIENT
Start: 2019-04-16 | End: 2019-04-16 | Stop reason: HOSPADM

## 2019-04-16 RX ORDER — PANTOPRAZOLE SODIUM 40 MG/1
40 TABLET, DELAYED RELEASE ORAL DAILY
Qty: 30 TABLET | Refills: 0 | Status: SHIPPED | OUTPATIENT
Start: 2019-04-16 | End: 2019-05-13 | Stop reason: SDUPTHER

## 2019-04-16 RX ADMIN — PROPOFOL 100 MG: 10 INJECTION, EMULSION INTRAVENOUS at 09:26

## 2019-04-16 RX ADMIN — PROPOFOL 50 MG: 10 INJECTION, EMULSION INTRAVENOUS at 09:41

## 2019-04-16 RX ADMIN — SODIUM CHLORIDE: 0.9 INJECTION, SOLUTION INTRAVENOUS at 09:08

## 2019-04-16 RX ADMIN — PROPOFOL 50 MG: 10 INJECTION, EMULSION INTRAVENOUS at 09:45

## 2019-04-16 RX ADMIN — PROPOFOL 50 MG: 10 INJECTION, EMULSION INTRAVENOUS at 09:35

## 2019-04-16 RX ADMIN — PROPOFOL 50 MG: 10 INJECTION, EMULSION INTRAVENOUS at 09:29

## 2019-04-16 RX ADMIN — LIDOCAINE HYDROCHLORIDE ANHYDROUS 50 MG: 10 INJECTION, SOLUTION INFILTRATION at 09:26

## 2019-04-16 RX ADMIN — PROPOFOL 50 MG: 10 INJECTION, EMULSION INTRAVENOUS at 09:38

## 2019-04-16 RX ADMIN — PROPOFOL 50 MG: 10 INJECTION, EMULSION INTRAVENOUS at 09:51

## 2019-04-16 RX ADMIN — PROPOFOL 50 MG: 10 INJECTION, EMULSION INTRAVENOUS at 09:32

## 2019-04-19 DIAGNOSIS — N95.1 SYMPTOMATIC MENOPAUSAL OR FEMALE CLIMACTERIC STATES: ICD-10-CM

## 2019-04-24 ENCOUNTER — HOSPITAL ENCOUNTER (OUTPATIENT)
Dept: ULTRASOUND IMAGING | Facility: HOSPITAL | Age: 49
Discharge: HOME | End: 2019-04-24
Attending: INTERNAL MEDICINE
Payer: COMMERCIAL

## 2019-04-24 DIAGNOSIS — K76.9 LIVER LESION: ICD-10-CM

## 2019-04-24 PROCEDURE — 76705 ECHO EXAM OF ABDOMEN: CPT

## 2019-04-26 ENCOUNTER — AMB VIDEO VISIT (OUTPATIENT)
Dept: OTHER | Facility: HOSPITAL | Age: 49
End: 2019-04-26

## 2019-04-26 ENCOUNTER — AMB VIDEO VISIT (OUTPATIENT)
Dept: URGENT CARE | Facility: CLINIC | Age: 49
End: 2019-04-26
Payer: COMMERCIAL

## 2019-04-26 DIAGNOSIS — J01.90 ACUTE NON-RECURRENT SINUSITIS, UNSPECIFIED LOCATION: Primary | ICD-10-CM

## 2019-04-26 PROCEDURE — 99201 PR OFFICE OUTPATIENT NEW 10 MINUTES: CPT | Performed by: PHYSICIAN ASSISTANT

## 2019-04-26 RX ORDER — AMOXICILLIN AND CLAVULANATE POTASSIUM 875; 125 MG/1; MG/1
1 TABLET, FILM COATED ORAL EVERY 12 HOURS SCHEDULED
Qty: 14 TABLET | Refills: 0 | Status: SHIPPED | OUTPATIENT
Start: 2019-04-26 | End: 2019-05-03

## 2019-05-06 ENCOUNTER — TELEPHONE (OUTPATIENT)
Dept: FAMILY MEDICINE CLINIC | Facility: CLINIC | Age: 49
End: 2019-05-06

## 2019-05-06 DIAGNOSIS — G43.709 CHRONIC MIGRAINE WITHOUT AURA WITHOUT STATUS MIGRAINOSUS, NOT INTRACTABLE: Primary | ICD-10-CM

## 2019-05-10 ENCOUNTER — TELEPHONE (OUTPATIENT)
Dept: GYNECOLOGIC ONCOLOGY | Facility: CLINIC | Age: 49
End: 2019-05-10

## 2019-05-10 ENCOUNTER — APPOINTMENT (OUTPATIENT)
Dept: LAB | Facility: HOSPITAL | Age: 49
End: 2019-05-10
Attending: INTERNAL MEDICINE
Payer: COMMERCIAL

## 2019-05-10 DIAGNOSIS — K58.2 IRRITABLE BOWEL SYNDROME WITH BOTH CONSTIPATION AND DIARRHEA: ICD-10-CM

## 2019-05-10 LAB — 25(OH)D3 SERPL-MCNC: 16.5 NG/ML (ref 30–100)

## 2019-05-10 PROCEDURE — 82306 VITAMIN D 25 HYDROXY: CPT

## 2019-05-10 PROCEDURE — 36415 COLL VENOUS BLD VENIPUNCTURE: CPT

## 2019-05-13 ENCOUNTER — OFFICE VISIT (OUTPATIENT)
Dept: GYNECOLOGIC ONCOLOGY | Facility: CLINIC | Age: 49
End: 2019-05-13
Payer: COMMERCIAL

## 2019-05-13 ENCOUNTER — TELEPHONE (OUTPATIENT)
Dept: GASTROENTEROLOGY | Facility: AMBULARY SURGERY CENTER | Age: 49
End: 2019-05-13

## 2019-05-13 VITALS
BODY MASS INDEX: 23.03 KG/M2 | HEART RATE: 78 BPM | DIASTOLIC BLOOD PRESSURE: 68 MMHG | WEIGHT: 134.9 LBS | TEMPERATURE: 98.7 F | SYSTOLIC BLOOD PRESSURE: 112 MMHG | HEIGHT: 64 IN

## 2019-05-13 DIAGNOSIS — Z15.01 BRCA2 GENE MUTATION POSITIVE: Primary | ICD-10-CM

## 2019-05-13 DIAGNOSIS — K21.9 GASTROESOPHAGEAL REFLUX DISEASE, ESOPHAGITIS PRESENCE NOT SPECIFIED: ICD-10-CM

## 2019-05-13 DIAGNOSIS — K76.9 LIVER LESION: Primary | ICD-10-CM

## 2019-05-13 DIAGNOSIS — E89.41 SYMPTOMATIC POSTSURGICAL MENOPAUSE: ICD-10-CM

## 2019-05-13 DIAGNOSIS — Z15.09 BRCA2 GENE MUTATION POSITIVE: Primary | ICD-10-CM

## 2019-05-13 PROCEDURE — 99212 OFFICE O/P EST SF 10 MIN: CPT | Performed by: PHYSICIAN ASSISTANT

## 2019-05-14 RX ORDER — PANTOPRAZOLE SODIUM 40 MG/1
40 TABLET, DELAYED RELEASE ORAL DAILY
Qty: 30 TABLET | Refills: 5 | Status: SHIPPED | OUTPATIENT
Start: 2019-05-14 | End: 2019-09-06 | Stop reason: ALTCHOICE

## 2019-05-15 ENCOUNTER — TELEPHONE (OUTPATIENT)
Dept: GASTROENTEROLOGY | Facility: CLINIC | Age: 49
End: 2019-05-15

## 2019-05-15 ENCOUNTER — OFFICE VISIT (OUTPATIENT)
Dept: ENDOCRINOLOGY | Facility: CLINIC | Age: 49
End: 2019-05-15
Payer: COMMERCIAL

## 2019-05-15 ENCOUNTER — LAB (OUTPATIENT)
Dept: LAB | Facility: HOSPITAL | Age: 49
End: 2019-05-15
Payer: COMMERCIAL

## 2019-05-15 VITALS
WEIGHT: 138 LBS | HEART RATE: 78 BPM | DIASTOLIC BLOOD PRESSURE: 90 MMHG | BODY MASS INDEX: 23.56 KG/M2 | HEIGHT: 64 IN | SYSTOLIC BLOOD PRESSURE: 130 MMHG

## 2019-05-15 DIAGNOSIS — E55.9 VITAMIN D DEFICIENCY: Primary | ICD-10-CM

## 2019-05-15 DIAGNOSIS — E04.2 MULTIPLE THYROID NODULES: ICD-10-CM

## 2019-05-15 DIAGNOSIS — E05.20 TOXIC MULTINODULAR GOITER: ICD-10-CM

## 2019-05-15 DIAGNOSIS — E05.90 SUBCLINICAL HYPERTHYROIDISM: ICD-10-CM

## 2019-05-15 DIAGNOSIS — E55.9 VITAMIN D DEFICIENCY: ICD-10-CM

## 2019-05-15 DIAGNOSIS — Z15.09 BRCA2 GENE MUTATION POSITIVE: ICD-10-CM

## 2019-05-15 DIAGNOSIS — Z15.01 BRCA2 GENE MUTATION POSITIVE: ICD-10-CM

## 2019-05-15 DIAGNOSIS — E05.20 TOXIC MULTINODULAR GOITER: Primary | ICD-10-CM

## 2019-05-15 LAB
CANCER AG125 SERPL-ACNC: 8.3 U/ML (ref 0–30)
T4 FREE SERPL-MCNC: 1.02 NG/DL (ref 0.76–1.46)
TSH SERPL DL<=0.05 MIU/L-ACNC: 0.52 UIU/ML (ref 0.36–3.74)

## 2019-05-15 PROCEDURE — 86304 IMMUNOASSAY TUMOR CA 125: CPT

## 2019-05-15 PROCEDURE — 84443 ASSAY THYROID STIM HORMONE: CPT

## 2019-05-15 PROCEDURE — 36415 COLL VENOUS BLD VENIPUNCTURE: CPT

## 2019-05-15 PROCEDURE — 99214 OFFICE O/P EST MOD 30 MIN: CPT | Performed by: INTERNAL MEDICINE

## 2019-05-15 PROCEDURE — 84439 ASSAY OF FREE THYROXINE: CPT

## 2019-05-15 RX ORDER — ERGOCALCIFEROL (VITAMIN D2) 1250 MCG
50000 CAPSULE ORAL WEEKLY
Qty: 8 CAPSULE | Refills: 0 | Status: SHIPPED | OUTPATIENT
Start: 2019-05-15 | End: 2019-05-31 | Stop reason: SDUPTHER

## 2019-05-17 ENCOUNTER — TELEPHONE (OUTPATIENT)
Dept: ENDOCRINOLOGY | Facility: CLINIC | Age: 49
End: 2019-05-17

## 2019-05-17 ENCOUNTER — TELEPHONE (OUTPATIENT)
Dept: GASTROENTEROLOGY | Facility: CLINIC | Age: 49
End: 2019-05-17

## 2019-05-22 ENCOUNTER — OFFICE VISIT (OUTPATIENT)
Dept: SURGICAL ONCOLOGY | Facility: CLINIC | Age: 49
End: 2019-05-22
Payer: COMMERCIAL

## 2019-05-22 ENCOUNTER — OFFICE VISIT (OUTPATIENT)
Dept: DERMATOLOGY | Facility: CLINIC | Age: 49
End: 2019-05-22
Payer: COMMERCIAL

## 2019-05-22 VITALS
SYSTOLIC BLOOD PRESSURE: 120 MMHG | HEIGHT: 64 IN | WEIGHT: 138 LBS | DIASTOLIC BLOOD PRESSURE: 80 MMHG | BODY MASS INDEX: 23.56 KG/M2 | RESPIRATION RATE: 16 BRPM | HEART RATE: 83 BPM | TEMPERATURE: 97.7 F

## 2019-05-22 VITALS — TEMPERATURE: 98 F | BODY MASS INDEX: 22.66 KG/M2 | WEIGHT: 136 LBS | HEIGHT: 65 IN

## 2019-05-22 DIAGNOSIS — Z85.820 HISTORY OF MELANOMA: ICD-10-CM

## 2019-05-22 DIAGNOSIS — D23.9 DYSPLASTIC NEVI: ICD-10-CM

## 2019-05-22 DIAGNOSIS — L57.8 ACTINIC SKIN DAMAGE: ICD-10-CM

## 2019-05-22 DIAGNOSIS — Z85.820 HISTORY OF MALIGNANT MELANOMA: Primary | ICD-10-CM

## 2019-05-22 DIAGNOSIS — L71.9 ROSACEA: ICD-10-CM

## 2019-05-22 DIAGNOSIS — L82.1 SEBORRHEIC KERATOSIS: ICD-10-CM

## 2019-05-22 DIAGNOSIS — Z08 ENCOUNTER FOR FOLLOW-UP EXAMINATION AFTER COMPLETED TREATMENT FOR MALIGNANT NEOPLASM: Primary | ICD-10-CM

## 2019-05-22 DIAGNOSIS — D22.9 MULTIPLE BENIGN MELANOCYTIC NEVI: ICD-10-CM

## 2019-05-22 DIAGNOSIS — Z85.3 HISTORY OF RIGHT BREAST CANCER: ICD-10-CM

## 2019-05-22 DIAGNOSIS — K76.9 LIVER LESION: ICD-10-CM

## 2019-05-22 DIAGNOSIS — D23.9 DERMATOFIBROMA: ICD-10-CM

## 2019-05-22 PROCEDURE — 99203 OFFICE O/P NEW LOW 30 MIN: CPT | Performed by: DERMATOLOGY

## 2019-05-22 PROCEDURE — 99214 OFFICE O/P EST MOD 30 MIN: CPT | Performed by: NURSE PRACTITIONER

## 2019-05-23 ENCOUNTER — TELEPHONE (OUTPATIENT)
Dept: GASTROENTEROLOGY | Facility: CLINIC | Age: 49
End: 2019-05-23

## 2019-05-23 DIAGNOSIS — K76.9 LIVER LESION: Primary | ICD-10-CM

## 2019-05-28 ENCOUNTER — APPOINTMENT (OUTPATIENT)
Dept: LAB | Facility: HOSPITAL | Age: 49
End: 2019-05-28
Payer: COMMERCIAL

## 2019-05-28 DIAGNOSIS — K76.9 LIVER LESION: ICD-10-CM

## 2019-05-28 LAB
BUN SERPL-MCNC: 19 MG/DL (ref 5–25)
CREAT SERPL-MCNC: 0.89 MG/DL (ref 0.6–1.3)
GFR SERPL CREATININE-BSD FRML MDRD: 78 ML/MIN/1.73SQ M

## 2019-05-28 PROCEDURE — 84520 ASSAY OF UREA NITROGEN: CPT

## 2019-05-28 PROCEDURE — 82565 ASSAY OF CREATININE: CPT

## 2019-05-28 PROCEDURE — 36415 COLL VENOUS BLD VENIPUNCTURE: CPT

## 2019-05-30 ENCOUNTER — HOSPITAL ENCOUNTER (OUTPATIENT)
Dept: RADIOLOGY | Facility: HOSPITAL | Age: 49
Discharge: HOME | End: 2019-05-30
Payer: COMMERCIAL

## 2019-05-30 DIAGNOSIS — K76.9 LIVER LESION: ICD-10-CM

## 2019-05-30 PROCEDURE — 74183 MRI ABD W/O CNTR FLWD CNTR: CPT

## 2019-05-30 PROCEDURE — A9585 GADOBUTROL INJECTION: HCPCS | Performed by: RADIOLOGY

## 2019-05-30 RX ADMIN — GADOBUTROL 6 ML: 604.72 INJECTION INTRAVENOUS at 21:46

## 2019-05-31 ENCOUNTER — OFFICE VISIT (OUTPATIENT)
Dept: FAMILY MEDICINE CLINIC | Facility: CLINIC | Age: 49
End: 2019-05-31
Payer: COMMERCIAL

## 2019-05-31 VITALS
HEIGHT: 64 IN | BODY MASS INDEX: 23.82 KG/M2 | SYSTOLIC BLOOD PRESSURE: 110 MMHG | WEIGHT: 139.5 LBS | TEMPERATURE: 96.9 F | HEART RATE: 70 BPM | RESPIRATION RATE: 16 BRPM | DIASTOLIC BLOOD PRESSURE: 70 MMHG

## 2019-05-31 DIAGNOSIS — E55.9 VITAMIN D DEFICIENCY: ICD-10-CM

## 2019-05-31 DIAGNOSIS — G43.709 CHRONIC MIGRAINE WITHOUT AURA WITHOUT STATUS MIGRAINOSUS, NOT INTRACTABLE: ICD-10-CM

## 2019-05-31 DIAGNOSIS — K21.9 GASTROESOPHAGEAL REFLUX DISEASE WITHOUT ESOPHAGITIS: Primary | ICD-10-CM

## 2019-05-31 PROBLEM — J06.9 URI (UPPER RESPIRATORY INFECTION): Status: RESOLVED | Noted: 2018-01-08 | Resolved: 2019-05-31

## 2019-05-31 PROCEDURE — 3008F BODY MASS INDEX DOCD: CPT | Performed by: FAMILY MEDICINE

## 2019-05-31 PROCEDURE — 99212 OFFICE O/P EST SF 10 MIN: CPT | Performed by: FAMILY MEDICINE

## 2019-05-31 RX ORDER — RIZATRIPTAN BENZOATE 10 MG/1
TABLET ORAL
Qty: 27 TABLET | Refills: 1 | Status: SHIPPED | OUTPATIENT
Start: 2019-05-31 | End: 2020-03-31 | Stop reason: SDUPTHER

## 2019-05-31 RX ORDER — ERGOCALCIFEROL (VITAMIN D2) 1250 MCG
50000 CAPSULE ORAL WEEKLY
Qty: 4 CAPSULE | Refills: 0 | Status: SHIPPED | OUTPATIENT
Start: 2019-05-31 | End: 2019-09-06 | Stop reason: ALTCHOICE

## 2019-06-04 DIAGNOSIS — K76.9 LIVER LESION: Primary | ICD-10-CM

## 2019-06-05 DIAGNOSIS — N95.1 MENOPAUSAL SYMPTOMS: ICD-10-CM

## 2019-06-05 RX ORDER — VENLAFAXINE HYDROCHLORIDE 37.5 MG/1
CAPSULE, EXTENDED RELEASE ORAL
Qty: 90 CAPSULE | Refills: 1 | Status: SHIPPED | OUTPATIENT
Start: 2019-06-05 | End: 2019-11-22 | Stop reason: SDUPTHER

## 2019-06-25 ENCOUNTER — TRANSCRIBE ORDERS (OUTPATIENT)
Dept: ADMINISTRATIVE | Facility: HOSPITAL | Age: 49
End: 2019-06-25

## 2019-06-25 ENCOUNTER — APPOINTMENT (OUTPATIENT)
Dept: LAB | Facility: HOSPITAL | Age: 49
End: 2019-06-25

## 2019-06-25 DIAGNOSIS — Z00.8 HEALTH EXAMINATION IN POPULATION SURVEY: ICD-10-CM

## 2019-06-25 DIAGNOSIS — Z00.8 HEALTH EXAMINATION IN POPULATION SURVEY: Primary | ICD-10-CM

## 2019-06-25 LAB
CHOLEST SERPL-MCNC: 196 MG/DL (ref 50–200)
EST. AVERAGE GLUCOSE BLD GHB EST-MCNC: 103 MG/DL
HBA1C MFR BLD: 5.2 % (ref 4.2–6.3)
HDLC SERPL-MCNC: 63 MG/DL (ref 40–60)
LDLC SERPL CALC-MCNC: 113 MG/DL (ref 0–100)
NONHDLC SERPL-MCNC: 133 MG/DL
TRIGL SERPL-MCNC: 98 MG/DL

## 2019-06-25 PROCEDURE — 83036 HEMOGLOBIN GLYCOSYLATED A1C: CPT

## 2019-06-25 PROCEDURE — 80061 LIPID PANEL: CPT

## 2019-06-25 PROCEDURE — 36415 COLL VENOUS BLD VENIPUNCTURE: CPT

## 2019-07-01 ENCOUNTER — OFFICE VISIT (OUTPATIENT)
Dept: GASTROENTEROLOGY | Facility: CLINIC | Age: 49
End: 2019-07-01
Payer: COMMERCIAL

## 2019-07-01 VITALS
SYSTOLIC BLOOD PRESSURE: 114 MMHG | WEIGHT: 138.4 LBS | HEART RATE: 72 BPM | HEIGHT: 64 IN | BODY MASS INDEX: 23.63 KG/M2 | DIASTOLIC BLOOD PRESSURE: 78 MMHG | TEMPERATURE: 97.9 F

## 2019-07-01 DIAGNOSIS — K76.9 LIVER LESION: ICD-10-CM

## 2019-07-01 DIAGNOSIS — K58.2 IRRITABLE BOWEL SYNDROME WITH BOTH CONSTIPATION AND DIARRHEA: ICD-10-CM

## 2019-07-01 DIAGNOSIS — K21.9 GASTROESOPHAGEAL REFLUX DISEASE WITHOUT ESOPHAGITIS: Primary | ICD-10-CM

## 2019-07-01 PROCEDURE — 99214 OFFICE O/P EST MOD 30 MIN: CPT | Performed by: INTERNAL MEDICINE

## 2019-07-01 NOTE — PROGRESS NOTES
[facility] - Outpatient Follow-up Note  Kristal Calderon 57 y o  female MRN: [id number]  Encounter: [id number]          ASSESSMENT AND PLAN:  Ms Helen Toth was seen today for a liver lesion  Liver lesion: This is less that 1 cm at 6 mm, appears unchanged over time and is likely benign  We will repeat US in 6 months to ensure the lesion remains stable  If no significant change in size will not do further monitoring  Dyspepsia: She reports her dyspepsia has improved to the point that she is asymptomatic  I recommend she wean off pantoprazole 40 mg and take ranitidine or omeprazole prn  She will continue Culturelle QD  Irritable Bowel Syndrome with both constipation and diarrhea: She is asymptomatic  Her colonoscopy of 4/16/19 was unremarkable  She will continue Culturelle probiotic QD  Follow-up as needed  ______________________________________________________________________    SUBJECTIVE:  Kristal Calderon is a 57 y o  female in the office for follow-up of a liver lesion and dyspepsia  Liver lesion seen on prior MRI and US was confirmed via MRI 5/30/19  There is a 6 x 7 mm cyst adjacent to the falciform ligament  The previously identified lesion remained occult on MRI  There was no focus of arterial enhancement to suggest [address]  EGD of 4/16/19 showed gastritis  Biopsies were negative for H  Pylori and celiac disease  Colonoscopy of 4/16/19 was unremarkable  She is feeling well  She reports her dyspepsia has improved to the point that she is asymptomatic  She continues pantoprazole 40 mg QD and Culturelle QD  REVIEW OF SYSTEMS IS OTHERWISE NEGATIVE        Historical Information   Past Medical History:   Diagnosis Date    Anxiety and depression     Breast [facility] ([facility] [address] [facility]     Easy bruising     Easy bruising tendency     Gastritis     Hyperthyroidism     Melanoma ([facility] [address] [facility]     1999, inner right thigh     Past Surgical History:   Procedure Laterality Date    BREAST SURGERY      Reported history of breast surgery for biopsy    COLONOSCOPY      HYSTERECTOMY      KNEE SURGERY      MASTECTOMY      OOPHORECTOMY      PELVIC LAPAROSCOPY      MT COLONOSCOPY FLX DX W/COLLJ SPEC WHEN PFRMD N/A 4/16/2019    Procedure: COLONOSCOPY;  Surgeon: Darrin Gaming MD;  Location: [facility]; Service: Gastroenterology    MT ESOPHAGOGASTRODUODENOSCOPY TRANSORAL DIAGNOSTIC N/A 4/16/2019    Procedure: ESOPHAGOGASTRODUODENOSCOPY (EGD); Surgeon: Darrin Gaming MD;  Location: [facility];   Service: Gastroenterology    SENTINEL LYMPH NODE BIOPSY      SIMPLE MASTECTOMY Bilateral     SKIN LESION EXCISION Right     Excision of lesion lower extremity      Social History   Social History     Substance and Sexual Activity   Alcohol Use Yes    Frequency: Monthly or less    Comment: varies     Social History     Substance and Sexual Activity   Drug Use No     Social History     Tobacco Use   Smoking Status Never Smoker   Smokeless Tobacco Never Used     Family History   Problem Relation Age of Onset    Breast cancer Mother     Breast cancer Maternal Aunt     Thyroid disease Maternal Aunt     Liver cancer Father     Kidney cancer Maternal Grandmother     Heart attack Paternal Aunt         Atrial myocardial infarction    Diabetes Paternal Aunt     Colon cancer Family     Lung cancer Family        Meds/Allergies       Current Outpatient Medications:     azelastine (ASTELIN) 0 1 % nasal spray    conjugated estrogens (PREMARIN) 0 625 mg tablet    ergocalciferol (ERGOCALCIFEROL) 46665 units capsule    fluticasone (FLONASE) 50 mcg/act nasal spray    methimazole (TAPAZOLE) 5 mg tablet    pantoprazole (PROTONIX) 40 mg tablet    Probiotic Product (PROBIOTIC DAILY PO)    rizatriptan (MAXALT) 10 MG tablet    venlafaxine (EFFEXOR-XR) 37 5 mg 24 hr capsule    Allergies   Allergen Reactions    Cat Hair Extract Sneezing    Dust Mite Extract Sneezing    Molds & Smuts Sneezing    Morphine Nausea Only, Vomiting and Drowsiness           Objective     Blood pressure 114/78, pulse 72, temperature 97 9 °F (36 6 °C), temperature source Tympanic, height 5' 4" (1 626 m), weight 62 8 kg (138 lb 6 4 oz)  Body mass index is 23 76 kg/m²  PHYSICAL EXAM:      General Appearance:   Alert, cooperative, no distress   HEENT:   Normocephalic, atraumatic, anicteric      Neck:  Supple, symmetrical, trachea midline   Lungs:   Clear to auscultation bilaterally; no rales, rhonchi or wheezing; respirations unlabored    Heart[de-identified]   Regular rate and rhythm; no murmur, rub, or gallop  Abdomen:   Soft, non-tender, non-distended; normal bowel sounds; no masses, no organomegaly    Genitalia:   Deferred    Rectal:   Deferred    Extremities:  No cyanosis, clubbing or edema    Pulses:  2+ and symmetric    Skin:  No jaundice, rashes, or lesions    Lymph nodes:  No palpable cervical lymphadenopathy        Lab Results:   No visits with results within 1 Day(s) from this visit  Latest known visit with results is:   Appointment on 06/25/2019   Component Date Value    Hemoglobin A1C 06/25/2019 5 2     EAG 06/25/2019 103     Cholesterol 06/25/2019 196     Triglycerides 06/25/2019 98     HDL, Direct 06/25/2019 63*    LDL Calculated 06/25/2019 113*    Non-HDL-Chol (CHOL-HDL) 06/25/2019 133          Radiology Results:   No results found  Attestation:   By signing my name below, Chadwick Hedricks, attest that this documentation has been prepared under the direction and in the presence of ALE Reynoso  Electronically Signed: Leonardo Eaton  7/1/19       I, Blaise Hanson, personally performed the services described in this documentation  All medical record entries made by the leonardo were at my direction and in my presence  I have reviewed the chart and discharge instructions and agree that the record reflects my personal performance and is accurate and complete   ALE Reynoso  7/1/19

## 2019-08-28 DIAGNOSIS — E05.90 SUBCLINICAL HYPERTHYROIDISM: ICD-10-CM

## 2019-08-28 DIAGNOSIS — E05.20 TOXIC MULTINODULAR GOITER: ICD-10-CM

## 2019-08-28 RX ORDER — METHIMAZOLE 5 MG/1
TABLET ORAL
Qty: 45 TABLET | Refills: 0 | Status: SHIPPED | OUTPATIENT
Start: 2019-08-28 | End: 2019-10-01 | Stop reason: SDUPTHER

## 2019-09-05 ENCOUNTER — TELEPHONE (OUTPATIENT)
Dept: ENDOCRINOLOGY | Facility: CLINIC | Age: 49
End: 2019-09-05

## 2019-09-05 DIAGNOSIS — E04.2 MULTIPLE THYROID NODULES: Primary | ICD-10-CM

## 2019-09-05 NOTE — PROGRESS NOTES
[address] [facility]  PATIENT:  Gloria Demarco  MRN:  [id number]  :  1970  DATE OF SERVICE:  2019      Assessment/Plan:     No problem-specific Assessment & Plan notes found for this encounter  {Assess/PlanSmartLinks:72855}            History of Present Illness: We had the pleasure of evaluating Gloria Demarco in neurological follow up  today for headaches  As you know,  {He/she (caps):98615} is a 54 y o  {RIGHT/LEFT:} handedfemale  Past medical history:  QTC      Headaches:  What medications do you take or have you taken for your headaches? PREVENTATIVE:  ***  ABORTIVE:  ***    What is your current pain level - ***    How often do the headaches occur? ***    Aura/warning and how long does it last - Halos around lights, Upset stomach, Neck pain, Light headed, Blind spot, Loss of vision Dizziness, Lethargy, Flashing light, weakness/numbness , Mood swings, Scotomas    What time of the day do the headaches start? {Time; of day:00906}    How long do the headaches last? ***    Are you ever headache free? {YES /HK:83767}    Describe your usual headache - Throbbing, Pounding, Pressure, Searing, Sharp, Shooting, Stabbing, Electric, Burning, Nagging, Aching, Dull, Tight band    Where is your headache located? What is the intensity of pain? {Pain severity:45977}    Associated symptoms:   - Decrease of appetite, nausea, vomiting, diarrhea  - Photophobia, phonophobia, sensitivity to smell   - Problem with concentration  - Blurred vision, change in pupil size, Ptosis, facial droop  - Red ear   - Lacrimation, runny or stuffed-up nose, flushing of face  - Tinnitus, light-headed or dizzy, stiff or sore neck,   - Hands or feet tingle or feel numb, prefer to be alone and in a dark room, unable to work    Number of days missed per month because of headaches:  Work (or school) days: ***  Social or Family activities: ***    Alternative therapies used in the past for headaches? {Therapies; headache:06255}  Headache are worse if the patient: cough, sneeze, bending over, moving bowel, running or exercising, walking, climbing stair, with sexual activity  Headache triggers:  {causes; headache:99492}    What time of the year do headaches occur more frequently? {Headache time related:64614}  Have you seen someone else for headaches or pain? {YES /G}  Have you had trigger point injection performed and how often? {YES /UU:93317}  Have you had Botox injection performed and how often? {YES /SD:62124}   Have you had epidural injections or transforaminal injections performed? {YES /LO:47846}    Have you used CBD or THC for your headaches and how often? {YES Z727429  Are you current pregnant or planning on getting pregnant? {YES /KL:90731}    Have you ever had any Brain imaging? {Yes     NT:82967} {reviewed images:40194}  - Reviewed old notes from physician seen in the past- see above HPI for summary of previous encounters       Past Medical History:   Diagnosis Date    Anxiety and depression     [facility] [address] [facility]     Easy bruising     Easy bruising tendency     Gastritis     Hyperthyroidism     Melanoma ([facility] [address] [facility]     , inner right thigh       Patient Active Problem List   Diagnosis    BRCA2 gene mutation positive    History of right breast cancer    Symptomatic postsurgical menopause    Anxiety    History of breast reconstruction    Depression    Esophageal reflux    Lymphedema of extremity    Migraine headache    Multiple thyroid nodules    Myofascial pain syndrome    Toxic multinodular goiter    Toxic nodular goiter w/o crisis    History of melanoma    Subclinical hyperthyroidism    Irritable bowel syndrome with both constipation and diarrhea    Liver lesion    Vitamin D deficiency    Encounter for follow-up examination after completed treatment for malignant neoplasm       Medications:      Current Outpatient Medications   Medication Sig Dispense Refill    azelastine (ASTELIN) 0 1 % nasal spray 1 spray into each nostril 2 (two) times a day Use in each nostril as directed      conjugated estrogens (PREMARIN) 0 625 mg tablet Take 1 tablet (0 625 mg total) by mouth daily 90 tablet 1    ergocalciferol (ERGOCALCIFEROL) 88710 units capsule Take 1 capsule (50,000 Units total) by mouth once a week 4 capsule 0    fluticasone (FLONASE) 50 mcg/act nasal spray 1 spray into each nostril daily      methimazole (TAPAZOLE) 5 mg tablet TAKE ONE TABLET BY MOUTH EVERY OTHER DAY 45 tablet 0    pantoprazole (PROTONIX) 40 mg tablet Take 1 tablet (40 mg total) by mouth daily 30 tablet 5    Probiotic Product (PROBIOTIC DAILY PO) Take by mouth daily      rizatriptan (MAXALT) 10 MG tablet 1 tablet the onset of migraine may repeat 2nd dose after 2 hours 27 tablet 1    venlafaxine (EFFEXOR-XR) 37 5 mg 24 hr capsule TAKE ONE CAPSULE BY MOUTH EVERY DAY 90 capsule 1     No current facility-administered medications for this visit  Allergies:       Allergies   Allergen Reactions    Cat Hair Extract Sneezing    Dust Mite Extract Sneezing    Molds & Smuts Sneezing    Morphine Nausea Only, Vomiting and Drowsiness       Family History:     Family History   Problem Relation Age of Onset    Breast cancer Mother     Breast cancer Maternal Aunt     Thyroid disease Maternal Aunt     Liver cancer Father     Kidney cancer Maternal Grandmother     Heart attack Paternal Aunt         Atrial myocardial infarction    Diabetes Paternal Aunt     Colon cancer Family     Lung cancer Family        Social History:     Social History     Socioeconomic History    Marital status: Single     Spouse name: Not on file    Number of children: Not on file    Years of education: Not on file    Highest education level: Not on file   Occupational History    Not on file   Social Needs    Financial resource strain: Not on file    Food insecurity:     Worry: Not on file     Inability: Not on file   Lauren Cruz Transportation needs:     Medical: Not on file     Non-medical: Not on file   Tobacco Use    Smoking status: Never Smoker    Smokeless tobacco: Never Used   Substance and Sexual Activity    Alcohol use: Yes     Frequency: Monthly or less     Comment: varies    Drug use: No    Sexual activity: Not on file   Lifestyle    Physical activity:     Days per week: Not on file     Minutes per session: Not on file    Stress: Not on file   Relationships    Social connections:     Talks on phone: Not on file     Gets together: Not on file     Attends [employer] service: Not on file     Active member of club or organization: Not on file     Attends meetings of clubs or organizations: Not on file     Relationship status: Not on file    Intimate partner violence:     Fear of current or ex partner: Not on file     Emotionally abused: Not on file     Physically abused: Not on file     Forced sexual activity: Not on file   Other Topics Concern    Not on file   Social History Narrative    Not on file         Objective:   Physical Exam:                                                                   Vitals: There were no vitals taken for this visit  BP Readings from Last 3 Encounters:   07/01/19 114/78   05/31/19 110/70   05/22/19 120/80     Pulse Readings from Last 3 Encounters:   07/01/19 72   05/31/19 70   05/22/19 83                     Review of Systems:   Review of Systems    I personally reviewed and updated the ROS that was entered by the medical assistant       I have spent *** minutes with {Patient /Family:55825} today in which greater than 50% of this time was spent in counseling/coordination of care regarding {AMB Counseling Topics:[id number]}        Author:  Edrick Mohs, MA 9/5/2019 9:18 AM

## 2019-09-05 NOTE — PROGRESS NOTES
[facility]  PATIENT:  Gretchen Casas  MRN:  [id number]  :  1970  DATE OF SERVICE:  2019      Assessment/Plan:        Problem List Items Addressed This Visit        Cardiovascular and Mediastinum    RESOLVED: Migraine headache    Relevant Medications    diclofenac potassium (CATAFLAM) 50 mg tablet       Other    Vitamin D deficiency    Relevant Orders    Vitamin D 25 hydroxy    Paroxysmal hemicrania - Primary    Relevant Medications    prochlorperazine (COMPAZINE) 10 mg tablet    diclofenac potassium (CATAFLAM) 50 mg tablet    Other Relevant Orders    ECG 12 lead    Vitamin B12         Diagnosis: Suspect patient has  Paroxysmal hemicrania  Migraine headache without aura     Preventive therapy for headaches:   - none needed  Abortive therapy for headaches:   - at the onset of her headache during the day patient is to take Maxalt 10 mg  If the headache persists patient may take Maxalt in 1-2 hours with diclofenac 50 mg and Compazine 10 mg   - at the onset of aura headache that she wakes up with in the middle the night patient should take her headache cocktail which is Maxalt 10 mg, Compazine 10 mg, diclofenac 50 mg (with milk or food)  Work up:   - lab:  B12, vitamin-D    Headache management instructions  - When patient has a moderate to severe headache, they should seek rest, initiate relaxation and apply cold compresses to the head  - Maintain regular sleep schedule  Adults need at least 7-8 hours of uninterrupted a night  - Limit over the counter medications such as Tylenol, Ibuprofen, Aleve, Excedrin  (No more than 2- 3 times a week or max 10 a month)  - Maintain headache diary  Free LAURENCE for a smart phone, which can be used is "Migraine rosa elena"  - Limit caffeine to 1-2 cups 8 to 16 oz a day or less  - Avoid dietary trigger  (aged cheese, peanuts, MSG, aspartame and nitrates)  - Patient is to have regular frequent meals to prevent headache onset      - Please drink at least 64 ounces of water a day to help remain hydrated  Neck pain:   - Neck pathology and poor posture, with straightening of the normal cervical lordosis, can cause headaches  Tightening of the neck muscles can irritate the nerves in the occipital region of the head and cause or worsen head pain  Thus neck strengthening and relaxation exercises, can help improve this particular pain  It is importance to have good posture for improving shoulder, neck, and head pain  - Here are some exercises which should take 5 minutes:     1  Standing, drop your head to one side while continuing to look ahead  Hold for 10 seconds then swap sides  Repeat twice more each side  To increase the stretch, drop the opposite shoulder  2  Standing again, lower your chin to your chest, hold for 10 and then look up to the ceiling and hold for 10  Repeat twice more  3  Next, standing straight again, look over your right shoulder and hold firm for 10 seconds, then over your left shoulder for 10  Repeat this 3 times  4  Finally, while sitting upright, bring your head forward and hold for 10, then all the way back and hold for 10  If this simple exercise does not help improve the posture, we will consider formal physical therapy in the future  Medication overuse headaches:   - Medication overuse headache [facility]) and analgesic overuse can negate the effectiveness of headache preventive measures  Avoid medications with narcotics, barbiturates, or caffeine in them as these can cause rebound headaches after very few doses and can interfere with other headache medicine efficacy  Taking  any acute/abortive over the counter medication or prescription drugs for more than 2-3 days a week can cause medication overuse headache  Importance of Healthy Sleep:  Behavioral sleep changes can promote restful, regular sleep and reduce headache   Simple changes like establishing consistent sleep and wake-up times, as well as getting between 7 and 8 hours of sleep a day, can make a world of difference  Experts also recommend avoiding substances that impair sleep, like caffeine, nicotine and alcohol, and also suggest winding down before bed to prevent sleep problems  To read more go to https://[employer]  org/resource-library/sleep/    Exercising for migraineurs:  Regular exercise can reduce the frequency and intensity of headaches and migraines  When one exercises, the body releases endorphins, which are the bodys natural painkillers  Exercise reduces stress and helps individuals to sleep at night  Exercising at least 30 to 40 minutes 3 times a week is sufficient for most patients  When exercising, follow this plan to prevent headaches:  - First, stay hydrated before, during, and after exercise  - Second part of the exercise plan is to eat sufficient food about an hour and a half before you exercise  Exercise causes ones blood sugar level to decrease, and it is important to have a source of energy    - Final part of the exercise plan is to warm-up  Do not jump into sudden, vigorous exercise if that triggers a headache or migraine  To read more go to https://[employer]  org/resource-library/effects-of-exercise-on-headaches-and-migraines/     Please call with any questions or concerns  Office number is [phone]             History of Present Illness: We had the pleasure of evaluating Jong Murphy in neurological consultation today for headaches  As you know,  she is a 54 y o  right handed  female  She is here today for evaluation of her headaches  She is a dietitian by profession       Past medical history:  QTC:  Will order today  History of Gastritis/IBS  Thyroid nodules  BRCA2 gene mutation positive/status post bilateral mastectomy in 2014  S/p resection of Melanoma right leg on the medial aspect of her thigh/lyphonoid resection on the same leg  Anxiety/Depression      Headaches:   Any family history of migraines? First cousin  Any family history of aneurysms? none    Have you seen someone else for headaches or pain? Yes     Headaches started at what age? In her 21's    What is your current pain level? 1-2/10    How often do the headaches occur? Varies from none to 7 a month    Are you ever headache free?  yes    Aura/Warning and how long does it last? Fatigued for an hour or two    What time of the day do the headaches start? In the middle of the night or in the afternoon    How long do the headaches last?   Without medication: 4-5 hour to 72   With medication: in an hour to 5 hours    Describe your usual headache? Starts off dull and then becomes throbbing, shooting/stabbing with moving    Where is your headache located? Starts with the right entire side and has orbital pain and at time move to the left side  What is the intensity of pain?   2 to 9/10    Associated symptoms:   - Nausea        - Photophobia       Osmophobia  - Insomnia  - Lacrimation  Nasal congestion/rhinorrhea, red eye  - Stiff or sore neck   - Dizziness   light headed  - Problems with concentration  - Blurred vision     - Prefer to be in a cool, quiet, dark room    Number of days missed per month because of headaches:  Work (or school) days: one a month  Social or Family activities: 1-5     Headache are worse if the patient:  exertion  Headache triggers:  Missing meals, weather change  What time of the year do headaches occur more frequently? Change of season    Have you had trigger point injection performed and how often? No  Have you had Botox injection performed and how often? No   Have you had epidural injections or transforaminal injections performed? No    Alternative therapies used in the past for headaches? Massage, chiropractor, yoga  Have you used CBD or THC for your headaches and how often?  No  How many caffeine products to drink a day? none  How much water to drink a day? 16 oz 4-5 bottle    Are you current pregnant or planning on getting pregnant? No - Hysterectomy    What medications do you take or have you taken for your headaches? Preventive therapy:   -   -   -   Abortive Therapy:   -   -   -              Neck pain:  When did the neck pain start? What is the intensity of your pain? Where is the pain located? Have you noticed decreased range of movement in your neck? Does your neck pain cause you to have headaches? At what time of the day is your neck pain:  - Worst:   - Best:  Is your neck pain associated with: dizziness, difficulty swallowing, balance problem, numbness or weakness in your arm or leg  What aggravates neck pain? lifting, computer work, using phone, chin to chest, looking at ceiling, turning head to left or right  What alleviates neck pain? hot or cold packs, massage, exercise, stretching, electrical stimulation, resting in specific position    Sleep Habit:  Is your sleep restful? Do you wake up with headaches? How many hours do you actually sleep? What time do you go to bed at night? What time do you wake up in am?  How often do you get up at night? Do you snore while asleep? Have you been told that you stop breathing during sleeping? Do you wake up tired in the morning? Do you take frequent naps during the day? Do you have jaw pain? Do you grind/clench your teeth at night? Do you have restless leg syndrome? Do you have nightmare or sleep walk? Mood:   Depression: Yes-on venlafaxine 37 5 mg  Anxiety: Yes    Have you ever had any Brain imaging? No   Recent laboratory data was reviewed  Medications and allergies were reviewed        Past Medical History:   Diagnosis Date    Anxiety and depression     BRCA positive     Breast cancer ([facility] )     Easy bruising     Easy bruising tendency     Gastritis     Hyperthyroidism     Melanoma ([facility] )     1999, inner right thigh    Rosacea        Patient Active Problem List   Diagnosis    BRCA2 gene mutation positive    History of right breast cancer    Symptomatic postsurgical menopause    Anxiety    History of breast reconstruction    Depression    Esophageal reflux    Lymphedema of extremity    Multiple thyroid nodules    Myofascial pain syndrome    Toxic multinodular goiter    Toxic nodular goiter w/o crisis    History of melanoma    Subclinical hyperthyroidism    Irritable bowel syndrome with both constipation and diarrhea    Liver lesion    Vitamin D deficiency    Encounter for follow-up examination after completed treatment for malignant neoplasm    Paroxysmal hemicrania       Medications:      Current Outpatient Medications   Medication Sig Dispense Refill    azelaic acid (AZELEX) 20 % cream Apply 1 application topically 2 (two) times a day      azelastine (ASTELIN) 0 1 % nasal spray 1 spray into each nostril 2 (two) times a day Use in each nostril as directed      Calcium Carb-Cholecalciferol (CALCIUM 500 + D) 500-200 MG-UNIT TABS Take 1 tablet by mouth 2 (two) times a day       cholecalciferol (VITAMIN D3) 1,000 units tablet Take 1,000 Units by mouth 2 (two) times a day      conjugated estrogens (PREMARIN) 0 625 mg tablet Take 1 tablet (0 625 mg total) by mouth daily 90 tablet 1    fluticasone (FLONASE) 50 mcg/act nasal spray 1 spray into each nostril daily      methimazole (TAPAZOLE) 5 mg tablet TAKE ONE TABLET BY MOUTH EVERY OTHER DAY 45 tablet 0    rizatriptan (MAXALT) 10 MG tablet 1 tablet the onset of migraine may repeat 2nd dose after 2 hours 27 tablet 1    venlafaxine (EFFEXOR-XR) 37 5 mg 24 hr capsule TAKE ONE CAPSULE BY MOUTH EVERY DAY 90 capsule 1    diclofenac potassium (CATAFLAM) 50 mg tablet 1 at the onset of a moderate to severe headache with food, t i d  P r n  10 tablet 0    prochlorperazine (COMPAZINE) 10 mg tablet 1 tab at onset of migraine, can repeat in 8 hours, can take with triptan/NSAID 10 tablet 0     No current facility-administered medications for this visit           Allergies: Allergies   Allergen Reactions    Cat Hair Extract Sneezing    Dust Mite Extract Sneezing    Molds & Smuts Sneezing    Morphine Nausea Only, Vomiting and Drowsiness       Family History:     Family History   Problem Relation Age of Onset    Breast cancer Mother     Breast cancer Maternal Aunt     Thyroid disease Maternal Aunt     Liver cancer Father     Kidney cancer Maternal Grandmother     Heart attack Paternal Aunt         Atrial myocardial infarction    Diabetes Paternal Aunt     Colon cancer Family     Lung cancer Family        Social History:     Social History     Socioeconomic History    Marital status: Single     Spouse name: Not on file    Number of children: Not on file    Years of education: Not on file    Highest education level: Not on file   Occupational History    Not on file   Social Needs    Financial resource strain: Not on file    Food insecurity:     Worry: Not on file     Inability: Not on file    Transportation needs:     Medical: Not on file     Non-medical: Not on file   Tobacco Use    Smoking status: Never Smoker    Smokeless tobacco: Never Used   Substance and Sexual Activity    Alcohol use: Yes     Frequency: Monthly or less     Comment: varies    Drug use: No    Sexual activity: Not on file   Lifestyle    Physical activity:     Days per week: Not on file     Minutes per session: Not on file    Stress: Not on file   Relationships    Social connections:     Talks on phone: Not on file     Gets together: Not on file     Attends [employer] service: Not on file     Active member of club or organization: Not on file     Attends meetings of clubs or organizations: Not on file     Relationship status: Not on file    Intimate partner violence:     Fear of current or ex partner: Not on file     Emotionally abused: Not on file     Physically abused: Not on file     Forced sexual activity: Not on file   Other Topics Concern    Not on file   Social History Narrative  Not on file         Objective:   Physical Exam:                                                                   Vitals:               /82 (BP Location: Left arm, Patient Position: Sitting, Cuff Size: Standard)   Pulse 72   Ht 5' 4" (1 626 m)   Wt 63 5 kg (140 lb)   BMI 24 03 kg/m²   BP Readings from Last 3 Encounters:   09/06/19 102/82   07/01/19 114/78   05/31/19 110/70     Pulse Readings from Last 3 Encounters:   09/06/19 72   07/01/19 72   05/31/19 70                CONSTITUTIONAL: Well developed, well nourished, well groomed  No dysmorphic features  Eyes:  PERRLA, EOM normal      Neck:  Normal ROM, neck supple  HEENT:  Normocephalic atraumatic  No meningismus  Oropharynx is clear and moist  No oral mucosal lesions  Chest:  Respirations regular and unlabored  Cardiovascular:  Distal extremities warm without palpable edema or tenderness, no observed significant swelling  Musculoskeletal:  Full range of motion  Skin:  warm and dry   Psychiatric:  Normal behavior and appropriate affect        Neurological Examination:   Mental status/cognitive function: Orientated to time, place and person  Cranial Nerves: 2 to 12 intact  Motor Exam:    5/5 upper extremity  5/5 lower extremity  Sensory: grossly intact light touch in all extremities  Reflexes:   2/4 upper extremity  2/4 lower extremity  No clonus noted  Coordination: Finger to nose intact bilaterally, no tremor noted  Gait: steady casual  gait, able to do tandem gait, romberg negative  Review of Systems:   Review of Systems   Constitutional: Negative  HENT: Positive for tinnitus  Eyes: Negative  Respiratory: Negative  Cardiovascular: Negative  Gastrointestinal: Negative  Endocrine: Negative  Genitourinary: Negative  Musculoskeletal: Negative  Skin: Negative  Allergic/Immunologic: Negative  Neurological: Positive for headaches  Hematological: Negative      Psychiatric/Behavioral: Negative  I personally reviewed and updated the ROS that was entered by the medical assistant       I have spent 60 minutes with Patient  today in which greater than 50% of this time was spent in counseling/coordination of care regarding Diagnostic results, Prognosis, Risks and benefits of tx options, Intructions for management, Patient and family education, Importance of tx compliance, Risk factor reductions, Impressions and Plan of care as above        Author:  Remy Paul MD 9/6/2019 9:37 AM

## 2019-09-06 ENCOUNTER — HOSPITAL ENCOUNTER (OUTPATIENT)
Dept: NON INVASIVE DIAGNOSTICS | Facility: HOSPITAL | Age: 49
Discharge: HOME | End: 2019-09-06
Attending: PSYCHIATRY & NEUROLOGY
Payer: COMMERCIAL

## 2019-09-06 ENCOUNTER — CONSULT (OUTPATIENT)
Dept: NEUROLOGY | Facility: CLINIC | Age: 49
End: 2019-09-06
Payer: COMMERCIAL

## 2019-09-06 ENCOUNTER — APPOINTMENT (OUTPATIENT)
Dept: LAB | Facility: HOSPITAL | Age: 49
End: 2019-09-06
Payer: COMMERCIAL

## 2019-09-06 VITALS
HEART RATE: 72 BPM | DIASTOLIC BLOOD PRESSURE: 82 MMHG | WEIGHT: 140 LBS | HEIGHT: 64 IN | SYSTOLIC BLOOD PRESSURE: 102 MMHG | BODY MASS INDEX: 23.9 KG/M2

## 2019-09-06 DIAGNOSIS — E55.9 VITAMIN D DEFICIENCY: ICD-10-CM

## 2019-09-06 DIAGNOSIS — G44.039 PAROXYSMAL HEMICRANIA: Primary | ICD-10-CM

## 2019-09-06 DIAGNOSIS — G44.039 PAROXYSMAL HEMICRANIA: ICD-10-CM

## 2019-09-06 DIAGNOSIS — G43.009 MIGRAINE WITHOUT AURA AND WITHOUT STATUS MIGRAINOSUS, NOT INTRACTABLE: ICD-10-CM

## 2019-09-06 LAB
25(OH)D3 SERPL-MCNC: 53.4 NG/ML (ref 30–100)
ATRIAL RATE: 72 BPM
P AXIS: 68 DEGREES
PR INTERVAL: 166 MS
QRS AXIS: 64 DEGREES
QRSD INTERVAL: 92 MS
QT INTERVAL: 412 MS
QTC INTERVAL: 451 MS
T WAVE AXIS: 47 DEGREES
VENTRICULAR RATE: 72 BPM
VIT B12 SERPL-MCNC: 186 PG/ML (ref 100–900)

## 2019-09-06 PROCEDURE — 82306 VITAMIN D 25 HYDROXY: CPT | Performed by: FAMILY MEDICINE

## 2019-09-06 PROCEDURE — 82607 VITAMIN B-12: CPT

## 2019-09-06 PROCEDURE — 99245 OFF/OP CONSLTJ NEW/EST HI 55: CPT | Performed by: PSYCHIATRY & NEUROLOGY

## 2019-09-06 PROCEDURE — 93005 ELECTROCARDIOGRAM TRACING: CPT

## 2019-09-06 PROCEDURE — 93010 ELECTROCARDIOGRAM REPORT: CPT | Performed by: INTERNAL MEDICINE

## 2019-09-06 PROCEDURE — 36415 COLL VENOUS BLD VENIPUNCTURE: CPT | Performed by: FAMILY MEDICINE

## 2019-09-06 RX ORDER — DICLOFENAC POTASSIUM 50 MG/1
TABLET, FILM COATED ORAL
Qty: 10 TABLET | Refills: 0 | Status: SHIPPED | OUTPATIENT
Start: 2019-09-06 | End: 2019-12-03 | Stop reason: SDUPTHER

## 2019-09-06 RX ORDER — MELATONIN
1000 2 TIMES DAILY
COMMUNITY

## 2019-09-06 RX ORDER — PROCHLORPERAZINE MALEATE 10 MG
TABLET ORAL
Qty: 10 TABLET | Refills: 0 | Status: SHIPPED | OUTPATIENT
Start: 2019-09-06 | End: 2019-12-03 | Stop reason: SDUPTHER

## 2019-09-06 NOTE — PATIENT INSTRUCTIONS
Diagnosis: Suspect patient has  Paroxysmal hemicrania  Migraine headache without aura     Preventive therapy for headaches:   - none needed  Abortive therapy for headaches:   - at the onset of her headache during the day patient is to take Maxalt 10 mg  If the headache persists patient may take Maxalt in 1-2 hours with diclofenac 50 mg and Compazine 10 mg   - at the onset of aura headache that she wakes up with in the middle the night patient should take her headache cocktail which is Maxalt 10 mg, Compazine 10 mg, diclofenac 50 mg (with milk or food)  Work up:   - lab:  B12, vitamin-D    Headache management instructions  - When patient has a moderate to severe headache, they should seek rest, initiate relaxation and apply cold compresses to the head  - Maintain regular sleep schedule  Adults need at least 7-8 hours of uninterrupted a night  - Limit over the counter medications such as Tylenol, Ibuprofen, Aleve, Excedrin  (No more than 2- 3 times a week or max 10 a month)  - Maintain headache diary  Free LAURENCE for a smart phone, which can be used is "Migraine rosa elena"  - Limit caffeine to 1-2 cups 8 to 16 oz a day or less  - Avoid dietary trigger  (aged cheese, peanuts, MSG, aspartame and nitrates)  - Patient is to have regular frequent meals to prevent headache onset  - Please drink at least 64 ounces of water a day to help remain hydrated  Neck pain:   - Neck pathology and poor posture, with straightening of the normal cervical lordosis, can cause headaches  Tightening of the neck muscles can irritate the nerves in the occipital region of the head and cause or worsen head pain  Thus neck strengthening and relaxation exercises, can help improve this particular pain  It is importance to have good posture for improving shoulder, neck, and head pain  - Here are some exercises which should take 5 minutes:     1  Standing, drop your head to one side while continuing to look ahead   Hold for 10 seconds then swap sides  Repeat twice more each side  To increase the stretch, drop the opposite shoulder  2  Standing again, lower your chin to your chest, hold for 10 and then look up to the ceiling and hold for 10  Repeat twice more  3  Next, standing straight again, look over your right shoulder and hold firm for 10 seconds, then over your left shoulder for 10  Repeat this 3 times  4  Finally, while sitting upright, bring your head forward and hold for 10, then all the way back and hold for 10  If this simple exercise does not help improve the posture, we will consider formal physical therapy in the future  Medication overuse headaches:   - Medication overuse headache [facility]) and analgesic overuse can negate the effectiveness of headache preventive measures  Avoid medications with narcotics, barbiturates, or caffeine in them as these can cause rebound headaches after very few doses and can interfere with other headache medicine efficacy  Taking  any acute/abortive over the counter medication or prescription drugs for more than 2-3 days a week can cause medication overuse headache  Importance of Healthy Sleep:  Behavioral sleep changes can promote restful, regular sleep and reduce headache  Simple changes like establishing consistent sleep and wake-up times, as well as getting between 7 and 8 hours of sleep a day, can make a world of difference  Experts also recommend avoiding substances that impair sleep, like caffeine, nicotine and alcohol, and also suggest winding down before bed to prevent sleep problems  To read more go to https://[employer]  org/resource-library/sleep/    Exercising for migraineurs:  Regular exercise can reduce the frequency and intensity of headaches and migraines  When one exercises, the body releases endorphins, which are the bodys natural painkillers  Exercise reduces stress and helps individuals to sleep at night   Exercising at least 30 to 40 minutes 3 times a week is sufficient for most patients  When exercising, follow this plan to prevent headaches:  - First, stay hydrated before, during, and after exercise  - Second part of the exercise plan is to eat sufficient food about an hour and a half before you exercise  Exercise causes ones blood sugar level to decrease, and it is important to have a source of energy    - Final part of the exercise plan is to warm-up  Do not jump into sudden, vigorous exercise if that triggers a headache or migraine  To read more go to https://[employer]  org/resource-library/effects-of-exercise-on-headaches-and-migraines/     Please call with any questions or concerns   Office number is [phone]

## 2019-09-09 ENCOUNTER — TELEPHONE (OUTPATIENT)
Dept: NEUROLOGY | Facility: CLINIC | Age: 49
End: 2019-09-09

## 2019-09-09 ENCOUNTER — HOSPITAL ENCOUNTER (OUTPATIENT)
Dept: ULTRASOUND IMAGING | Facility: HOSPITAL | Age: 49
Discharge: HOME | End: 2019-09-09
Attending: INTERNAL MEDICINE
Payer: COMMERCIAL

## 2019-09-09 DIAGNOSIS — E05.20 TOXIC MULTINODULAR GOITER: ICD-10-CM

## 2019-09-09 DIAGNOSIS — E04.2 MULTIPLE THYROID NODULES: ICD-10-CM

## 2019-09-09 PROCEDURE — 76536 US EXAM OF HEAD AND NECK: CPT

## 2019-09-09 RX ORDER — CYANOCOBALAMIN 1000 UG/ML
1000 INJECTION INTRAMUSCULAR; SUBCUTANEOUS
Status: SHIPPED | OUTPATIENT
Start: 2019-09-09

## 2019-09-09 NOTE — TELEPHONE ENCOUNTER
----- Message from Marry Ventura PA-C sent at 9/9/2019  7:25 AM EDT -----  Please call the patient regarding her abnormal result  Needs B12 injections every week for 4 weeks then monthly for 5 months    In addition B12 sublingual 1000 mcg daily

## 2019-09-09 NOTE — TELEPHONE ENCOUNTER
Pt called in for result  Made pt aware of recommendations  Pt [facility] over PCP  Pt scheduled for B12 injections  Please enter B12 orders       Week 1 9/11 0755  Week 2 9/18 0740  Week 3 9/25 0755  Week 4 10/02 0755  Month 1 11/06 0755  Month 2 12/03 0915 at follow up visit luis carlos Cordon  Month 3,4 and 5 (added to wait list for when 2020 prescriber schedule is added)

## 2019-09-11 ENCOUNTER — TELEPHONE (OUTPATIENT)
Dept: NEUROLOGY | Facility: CLINIC | Age: 49
End: 2019-09-11

## 2019-09-11 ENCOUNTER — TELEPHONE (OUTPATIENT)
Dept: FAMILY MEDICINE CLINIC | Facility: CLINIC | Age: 49
End: 2019-09-11

## 2019-09-11 ENCOUNTER — CLINICAL SUPPORT (OUTPATIENT)
Dept: NEUROLOGY | Facility: CLINIC | Age: 49
End: 2019-09-11
Payer: COMMERCIAL

## 2019-09-11 DIAGNOSIS — E53.8 B12 DEFICIENCY: Primary | ICD-10-CM

## 2019-09-11 DIAGNOSIS — R94.31 ABNORMAL EKG: Primary | ICD-10-CM

## 2019-09-11 PROCEDURE — 96372 THER/PROPH/DIAG INJ SC/IM: CPT | Performed by: PSYCHIATRY & NEUROLOGY

## 2019-09-11 RX ADMIN — CYANOCOBALAMIN 1000 MCG: 1000 INJECTION INTRAMUSCULAR; SUBCUTANEOUS at 07:57

## 2019-09-11 NOTE — TELEPHONE ENCOUNTER
Patient requesting review of ECG resulted on 9/6/19  Reviewed with Ronaldo Tompkins PA-C  Refer to cardiology for workup with abnormal ecg

## 2019-09-11 NOTE — TELEPHONE ENCOUNTER
Patient had a recent EKG ordered by neurologist  They are recommending she follow with cardiology  She would like you to review results & give your opinion as to whether you think it is necessary for her to see cardiology  Please advise

## 2019-09-11 NOTE — TELEPHONE ENCOUNTER
ECG 12 lead   Status: Final result   Study Result     Normal sinus rhythm    Inferior and anterolateral ST changes and T-wave changes suggestive of ischemia  Abnormal ECG  When compared with ECG of 26-DEC-2013 07:59,   ST T-wave abnormalities are more pronounced and suggestive of ischemia    Confirmed by Dimitris Amaro 50 ([id number]) on 9/6/2019 9:34:42 PM

## 2019-09-12 ENCOUNTER — TELEPHONE (OUTPATIENT)
Dept: NEUROLOGY | Facility: CLINIC | Age: 49
End: 2019-09-12

## 2019-09-12 NOTE — TELEPHONE ENCOUNTER
Call patient I looked at her EKG  There are nonspecific EKG changes which may or may not be of significance  Not sure if she is having any type of symptoms  Testing with include an echocardiogram and possibly stress test not sure she wants me to order these test OR follow-through with a Cardiology appointment

## 2019-09-12 NOTE — TELEPHONE ENCOUNTER
----- Message from [facility] sent at 9/12/2019 10:07 AM EDT -----      ----- Message -----  From: Hubert Licea RN  Sent: 9/11/2019  11:41 AM EDT  To: Neurology [facility] [address]        ----- Message -----  From: Jin Castaneda PA-C  Sent: 9/9/2019   7:25 AM EDT  To: Neurology [facility]    Please call the patient regarding her abnormal result  Needs B12 injections every week for 4 weeks then monthly for 5 months    In addition B12 sublingual 1000 mcg daily

## 2019-09-13 NOTE — TELEPHONE ENCOUNTER
Patient informed stated that she prefer you to order testing  And if they are abnormal she would go to cardiology  She had told her neurologist that she was more fatigued and had episodes of dizziness, that is why he ordered EKG  States that she starting taking Vitamin B-12 and she is feeling a little better

## 2019-09-16 ENCOUNTER — TELEPHONE (OUTPATIENT)
Dept: ENDOCRINOLOGY | Facility: CLINIC | Age: 49
End: 2019-09-16

## 2019-09-16 NOTE — TELEPHONE ENCOUNTER
----- Message from Xander Correia MD sent at 9/15/2019 10:41 PM EDT -----  Please call the patient regarding thyroid ultrasound- stable thyroid nodules - will monitor

## 2019-09-18 ENCOUNTER — CLINICAL SUPPORT (OUTPATIENT)
Dept: NEUROLOGY | Facility: CLINIC | Age: 49
End: 2019-09-18
Payer: COMMERCIAL

## 2019-09-18 DIAGNOSIS — E53.8 B12 DEFICIENCY: ICD-10-CM

## 2019-09-18 PROCEDURE — 96372 THER/PROPH/DIAG INJ SC/IM: CPT | Performed by: PSYCHIATRY & NEUROLOGY

## 2019-09-18 RX ADMIN — CYANOCOBALAMIN 1000 MCG: 1000 INJECTION INTRAMUSCULAR; SUBCUTANEOUS at 08:04

## 2019-09-18 NOTE — PROGRESS NOTES
Patient presented to office for week # 2 of B12 injections  With direct orders from [facility] [place], [place] I injected 1000 mcg of Cyanocobalamin into patient's right deltoid  Patient tolerated and no adverse reaction noted  Patient will follow up in 1 week for next weekly injection

## 2019-09-19 ENCOUNTER — LAB (OUTPATIENT)
Dept: LAB | Facility: HOSPITAL | Age: 49
End: 2019-09-19
Attending: INTERNAL MEDICINE
Payer: COMMERCIAL

## 2019-09-19 DIAGNOSIS — E04.2 MULTIPLE THYROID NODULES: ICD-10-CM

## 2019-09-19 LAB
T4 FREE SERPL-MCNC: 0.94 NG/DL (ref 0.76–1.46)
TSH SERPL DL<=0.05 MIU/L-ACNC: 0.69 UIU/ML (ref 0.36–3.74)

## 2019-09-19 PROCEDURE — 84439 ASSAY OF FREE THYROXINE: CPT

## 2019-09-19 PROCEDURE — 84443 ASSAY THYROID STIM HORMONE: CPT

## 2019-09-19 PROCEDURE — 36415 COLL VENOUS BLD VENIPUNCTURE: CPT

## 2019-09-24 ENCOUNTER — TELEPHONE (OUTPATIENT)
Dept: ENDOCRINOLOGY | Facility: CLINIC | Age: 49
End: 2019-09-24

## 2019-09-24 NOTE — TELEPHONE ENCOUNTER
----- Message from Ciarra Bustillos MD sent at 9/23/2019 10:01 AM EDT -----  Please call the patient regarding labs - thyroid function tests normal

## 2019-09-25 ENCOUNTER — CLINICAL SUPPORT (OUTPATIENT)
Dept: NEUROLOGY | Facility: CLINIC | Age: 49
End: 2019-09-25
Payer: COMMERCIAL

## 2019-09-25 DIAGNOSIS — E53.8 VITAMIN B12 DEFICIENCY: ICD-10-CM

## 2019-09-25 PROCEDURE — 96372 THER/PROPH/DIAG INJ SC/IM: CPT

## 2019-09-25 RX ADMIN — CYANOCOBALAMIN 1000 MCG: 1000 INJECTION INTRAMUSCULAR; SUBCUTANEOUS at 07:58

## 2019-09-25 NOTE — PROGRESS NOTES
Patient presented to office for her third B12 injection  With direct order from Kwadwo Dan PA-C I injected patient with 1,000 mcg Cyanocobalamin into left deltoid  Patient tolerated injection

## 2019-10-01 ENCOUNTER — OFFICE VISIT (OUTPATIENT)
Dept: ENDOCRINOLOGY | Facility: CLINIC | Age: 49
End: 2019-10-01
Payer: COMMERCIAL

## 2019-10-01 VITALS
DIASTOLIC BLOOD PRESSURE: 78 MMHG | HEIGHT: 64 IN | SYSTOLIC BLOOD PRESSURE: 110 MMHG | HEART RATE: 68 BPM | BODY MASS INDEX: 24.04 KG/M2 | WEIGHT: 140.8 LBS

## 2019-10-01 DIAGNOSIS — E55.9 VITAMIN D DEFICIENCY: ICD-10-CM

## 2019-10-01 DIAGNOSIS — E05.20 TOXIC MULTINODULAR GOITER: Primary | ICD-10-CM

## 2019-10-01 DIAGNOSIS — E05.90 SUBCLINICAL HYPERTHYROIDISM: ICD-10-CM

## 2019-10-01 PROCEDURE — 99214 OFFICE O/P EST MOD 30 MIN: CPT | Performed by: NURSE PRACTITIONER

## 2019-10-01 RX ORDER — METHIMAZOLE 5 MG/1
TABLET ORAL
Qty: 45 TABLET | Refills: 6 | Status: SHIPPED | OUTPATIENT
Start: 2019-10-01 | End: 2019-11-26 | Stop reason: SDUPTHER

## 2019-10-01 RX ORDER — RIBOFLAVIN (VITAMIN B2) 100 MG
100 TABLET ORAL 2 TIMES DAILY
COMMUNITY

## 2019-10-01 RX ORDER — MAGNESIUM 200 MG
1000 TABLET ORAL
COMMUNITY

## 2019-10-01 NOTE — PROGRESS NOTES
Established Patient Progress Note       Chief Complaint   Patient presents with    Follow-up        History of Present Illness:     Dana Segal is a 54 y o  female with a history of toxic adenomas, subclinical hyperthyroidism, and vitamin d deficiency  She had FNA of bl nodules 10 years back which were negative for malignancy and right upper pole nodule in 2013, which showed scattered follicular cells and scant colloid, insufficient for complete cytological evaluation  She denies neck pain, dysphagia, dysphagia, or dyspnea  For hyperthyroidism she is currently methimazole 5 mg every other day  Her most recent thyroid function test was normal  She denies symptoms of hypo or hyperthyroidism  For the vitamin d deficiency she is currently taking 2,000 units daily         Patient Active Problem List   Diagnosis    BRCA2 gene mutation positive    History of right breast cancer    Symptomatic postsurgical menopause    Anxiety    History of breast reconstruction    Depression    Esophageal reflux    Lymphedema of extremity    Multiple thyroid nodules    Myofascial pain syndrome    Toxic multinodular goiter    Toxic nodular goiter w/o crisis    History of melanoma    Subclinical hyperthyroidism    Irritable bowel syndrome with both constipation and diarrhea    Liver lesion    Vitamin D deficiency    Encounter for follow-up examination after completed treatment for malignant neoplasm    Paroxysmal hemicrania      Past Medical History:   Diagnosis Date    Anxiety and depression     BRCA positive     Breast cancer (HCC)     Easy bruising     Easy bruising tendency     Gastritis     Hyperthyroidism     Melanoma ([facility] [address] )     1999, inner right thigh    Rosacea       Past Surgical History:   Procedure Laterality Date    BREAST SURGERY      Reported history of breast surgery for biopsy    COLONOSCOPY      HYSTERECTOMY      KNEE SURGERY      MASTECTOMY      OOPHORECTOMY      PELVIC LAPAROSCOPY      WI COLONOSCOPY FLX DX W/COLLJ SPEC WHEN PFRMD N/A 4/16/2019    Procedure: COLONOSCOPY;  Surgeon: Ivonne Hickman MD;  [facility]; Service: Gastroenterology    WI ESOPHAGOGASTRODUODENOSCOPY TRANSORAL DIAGNOSTIC N/A 4/16/2019    Procedure: ESOPHAGOGASTRODUODENOSCOPY (EGD); Surgeon: Ivonne Hickman MD;  Location[facility];   Service: Gastroenterology    SENTINEL LYMPH NODE BIOPSY      SIMPLE MASTECTOMY Bilateral     SKIN LESION EXCISION Right     Excision of lesion lower extremity       Family History   Problem Relation Age of Onset    Breast cancer Mother     Breast cancer Maternal Aunt     Thyroid disease Maternal Aunt     Liver cancer Father     Kidney cancer Maternal Grandmother     Heart attack Paternal Aunt         Atrial myocardial infarction    Diabetes Paternal Aunt     Colon cancer Family     Lung cancer Family      Social History     Tobacco Use    Smoking status: Never Smoker    Smokeless tobacco: Never Used   Substance Use Topics    Alcohol use: Yes     Frequency: Monthly or less     Comment: varies     Allergies   Allergen Reactions    Cat Hair Extract Sneezing    Dust Mite Extract Sneezing    Molds & Smuts Sneezing    Morphine Nausea Only, Vomiting and Drowsiness       Current Outpatient Medications:     azelaic acid (AZELEX) 20 % cream, Apply 1 application topically 2 (two) times a day, Disp: , Rfl:     azelastine (ASTELIN) 0 1 % nasal spray, 1 spray into each nostril 2 (two) times a day Use in each nostril as directed, Disp: , Rfl:     Calcium Carb-Cholecalciferol (CALCIUM 500 + D) 500-200 MG-UNIT TABS, Take 1 tablet by mouth 2 (two) times a day , Disp: , Rfl:     cholecalciferol (VITAMIN D3) 1,000 units tablet, Take 1,000 Units by mouth 2 (two) times a day, Disp: , Rfl:     conjugated estrogens (PREMARIN) 0 625 mg tablet, Take 1 tablet (0 625 mg total) by mouth daily, Disp: 90 tablet, Rfl: 1    Cyanocobalamin (B-12) 1000 MCG SUBL, Place 1,000 mcg under the tongue, Disp: , Rfl:     diclofenac potassium (CATAFLAM) 50 mg tablet, 1 at the onset of a moderate to severe headache with food, t i d  P r n , Disp: 10 tablet, Rfl: 0    fluticasone (FLONASE) 50 mcg/act nasal spray, 1 spray into each nostril daily, Disp: , Rfl:     methimazole (TAPAZOLE) 5 mg tablet, TAKE ONE TABLET BY MOUTH EVERY OTHER DAY, Disp: 45 tablet, Rfl: 6    prochlorperazine (COMPAZINE) 10 mg tablet, 1 tab at onset of migraine, can repeat in 8 hours, can take with triptan/NSAID, Disp: 10 tablet, Rfl: 0    Riboflavin (B-2) 100 MG TABS, Take 100 mg by mouth 2 (two) times a day, Disp: , Rfl:     rizatriptan (MAXALT) 10 MG tablet, 1 tablet the onset of migraine may repeat 2nd dose after 2 hours, Disp: 27 tablet, Rfl: 1    venlafaxine (EFFEXOR-XR) 37 5 mg 24 hr capsule, TAKE ONE CAPSULE BY MOUTH EVERY DAY, Disp: 90 capsule, Rfl: 1    Current Facility-Administered Medications:     cyanocobalamin injection 1,000 mcg, 1,000 mcg, Intramuscular, Q30 Days, Jennifer Rocha PA-C, 1,000 mcg at 09/11/19 0757    cyanocobalamin injection 1,000 mcg, 1,000 mcg, Intramuscular, Weekly, Michael Falk PA-C, 1,000 mcg at 09/25/19 9106    Review of Systems   Constitutional: Negative for chills and fever  HENT: Negative for sore throat and trouble swallowing  Eyes: Negative for visual disturbance  Respiratory: Negative for shortness of breath  Cardiovascular: Negative for chest pain and palpitations  Gastrointestinal: Negative for abdominal pain, constipation and diarrhea  Endocrine: Negative for cold intolerance, heat intolerance, polydipsia, polyphagia and polyuria  Genitourinary: Negative for frequency  Musculoskeletal: Negative for arthralgias and myalgias  Skin: Negative for rash  Neurological: Negative for dizziness and tremors  Hematological: Negative for adenopathy  Psychiatric/Behavioral: Negative for sleep disturbance  All other systems reviewed and are negative        Physical Exam:  Body mass index is 24 17 kg/m²  /78   Pulse 68   Ht 5' 4" (1 626 m)   Wt 63 9 kg (140 lb 12 8 oz)   BMI 24 17 kg/m²    Wt Readings from Last 3 Encounters:   10/01/19 63 9 kg (140 lb 12 8 oz)   09/06/19 63 5 kg (140 lb)   07/01/19 62 8 kg (138 lb 6 4 oz)       Physical Exam   Constitutional: She is oriented to person, place, and time  She appears well-developed and well-nourished  No distress  HENT:   Head: Normocephalic and atraumatic  Eyes: Pupils are equal, round, and reactive to light  Conjunctivae are normal    Neck: Normal range of motion  Neck supple  No thyromegaly present  Cardiovascular: Normal rate, regular rhythm and normal heart sounds  Pulmonary/Chest: Effort normal and breath sounds normal  No respiratory distress  She has no wheezes  She has no rales  Abdominal: Soft  Bowel sounds are normal  She exhibits no distension  There is no tenderness  Musculoskeletal: Normal range of motion  She exhibits no edema  Neurological: She is alert and oriented to person, place, and time  Skin: Skin is warm and dry  Psychiatric: She has a normal mood and affect  Vitals reviewed  Labs:     Component      Latest Ref Rng & Units 9/6/2019 9/19/2019   Vit D, 25-Hydroxy      30 0 - 100 0 ng/mL 53 4    TSH 3RD GENERATON      0 358 - 3 740 uIU/mL  0 690   Free T4      0 76 - 1 46 ng/dL  0 94     THYROID ULTRASOUND     INDICATION:    E04 2: Nontoxic multinodular goiter  E05 20:  Thyrotoxicosis with toxic multinodular goiter without thyrotoxic crisis or storm      COMPARISON:  6/5/2018; 10/8/2015; 6/11/2014; 7/25/2013; 5/24/2010     TECHNIQUE:   Ultrasound of the thyroid was performed with a high frequency linear transducer in transverse and sagittal planes including volumetric imaging sweeps as well as traditional still imaging technique      FINDINGS:  Thyroid parenchyma is diffusely heterogeneous in echotexture with focal nodule(s) as described below      Right lobe:  5 7 x 1 0 x 1  0 cm  Left lobe:  6 3 x 1 8 x 1 9 cm  Isthmus:  0 1 cm      Nodule #1  Image 6144  RIGHT upper pole nodule measuring 1 2 x 0 7 x 0 8 cm  Unchanged from prior  COMPOSITION:  2 points, solid or almost completely solid   ECHOGENICITY:  1 point, hyperechoic or isoechoic  SHAPE:  0 points, wider-than-tall  MARGIN: 0 points, smooth  ECHOGENIC FOCI:  0 points, none or large comet-tail artifacts  TI-RADS Classification: TR 3 (3 points), Mildly suspicious  FNA if >2 5 cm  Follow if >1 5 cm  This nodule has been stable for greater than 5 years and does not necessitate additional follow-up      Nodule #2  Image 12,033  LEFT lower pole nodule measuring 3 1 x 1 4 x 1 2 cm  This nodule was not measured on the prior study, but in retrospect was present and unchanged based on review of the cine images  This nodule may represent 2 adjacent nodules previously measured   separately  COMPOSITION:  1 point, mixed cystic and solid  ECHOGENICITY:  1 point, hyperechoic or isoechoic  SHAPE:  0 points, wider-than-tall  MARGIN: 0 points, smooth  ECHOGENIC FOCI:  0 points, none or large comet-tail artifacts  TI-RADS Classification: TR 3 (3 points), Mildly suspicious  FNA if >2 5 cm  Follow if >1 5 cm        Nodule #3  Image 18,000 176  LEFT lower pole nodule measuring 1 5 x 1 x 1 5 cm  Unchanged from prior  COMPOSITION:  2 points, solid or almost completely solid   ECHOGENICITY:  1 point, hyperechoic or isoechoic  SHAPE:  0 points, wider-than-tall  MARGIN: 0 points, smooth  ECHOGENIC FOCI:  0 points, none or large comet-tail artifacts  TI-RADS Classification: TR 3 (3 points), Mildly suspicious  FNA if >2 5 cm  Follow if >1 5 cm  This nodule has been stable for greater than 5 years and does not necessitate additional follow-up                 There are additional nodules of lesser size and/or TI-RADS score    At the time of follow-up for the above described dominant nodules, these will be reevaluated in detailed at that time if needed              IMPRESSION:     No nodule meets current ACR criteria for requiring biopsy but followup ultrasound is recommended in 1 year  Impression & Plan:    Problem List Items Addressed This Visit        Endocrine    Toxic multinodular goiter - Primary    Relevant Medications    methimazole (TAPAZOLE) 5 mg tablet    Other Relevant Orders    Ambulatory referral to Surgical Oncology    Subclinical hyperthyroidism     Continue current dose of Methimazole  She is now considering total thyroidectomy given her history of cancer (breast and melanoma)  Is interested in consultation with surgeon  Referral given  Relevant Medications    methimazole (TAPAZOLE) 5 mg tablet    Other Relevant Orders    Ambulatory referral to Surgical Oncology    T4, free    TSH, 3rd generation       Other    Vitamin D deficiency     Continue vitamin d supplementation                Orders Placed This Encounter   Procedures    T4, free     Standing Status:   Future     Standing Expiration Date:   10/1/2021    TSH, 3rd generation     This is a patient instruction: This test is non-fasting  Please drink two glasses of water morning of bloodwork  Standing Status:   Future     Standing Expiration Date:   10/1/2021    Ambulatory referral to Surgical Oncology     Standing Status:   Future     Standing Expiration Date:   10/1/2020     Referral Priority:   Routine     Referral Type:   Consult - AMB     Referral Reason:   Specialty Services Required     Referred to Provider:   Demetrius Jack MD     Requested Specialty:   Surgical Oncology     Number of Visits Requested:   1     Expiration Date:   10/1/2020       Discussed with the patient and all questioned fully answered  She will call me if any problems arise        Counseled patient on diagnostic results, prognosis, risk and benefit of treatment options, instruction for management, importance of treatment compliance, Risk  factor reduction and impressions      Daniel Leblanc

## 2019-10-01 NOTE — ASSESSMENT & PLAN NOTE
Continue current dose of Methimazole  She is now considering total thyroidectomy given her history of cancer (breast and melanoma)  Is interested in consultation with surgeon  Referral given

## 2019-10-02 ENCOUNTER — HOSPITAL ENCOUNTER (OUTPATIENT)
Dept: NON INVASIVE DIAGNOSTICS | Facility: CLINIC | Age: 49
Discharge: HOME | End: 2019-10-02
Payer: COMMERCIAL

## 2019-10-02 ENCOUNTER — TELEPHONE (OUTPATIENT)
Dept: FAMILY MEDICINE CLINIC | Facility: CLINIC | Age: 49
End: 2019-10-02

## 2019-10-02 DIAGNOSIS — R94.31 ABNORMAL EKG: ICD-10-CM

## 2019-10-02 PROCEDURE — 93350 STRESS TTE ONLY: CPT

## 2019-10-02 PROCEDURE — 93351 STRESS TTE COMPLETE: CPT | Performed by: INTERNAL MEDICINE

## 2019-10-02 NOTE — TELEPHONE ENCOUNTER
Please call Gina Ayala and let her know that I reviewed her stress test and it did show that she has some myocardial ischemia, which is decreased blood supply to part of the heart in times of stress  I discussed it with PCP and we both agree that she needs to see Cardiology at this point  Thank you!

## 2019-10-03 ENCOUNTER — CLINICAL SUPPORT (OUTPATIENT)
Dept: NEUROLOGY | Facility: CLINIC | Age: 49
End: 2019-10-03
Payer: COMMERCIAL

## 2019-10-03 DIAGNOSIS — E53.8 B12 DEFICIENCY: ICD-10-CM

## 2019-10-03 DIAGNOSIS — R94.39 ABNORMAL CARDIOVASCULAR STRESS TEST: Primary | ICD-10-CM

## 2019-10-03 LAB
ARRHY DURING EX: NORMAL
CHEST PAIN STATEMENT: NORMAL
MAX DIASTOLIC BP: 60 MMHG
MAX HEART RATE: 179 BPM
MAX PREDICTED HEART RATE: 173 BPM
MAX. SYSTOLIC BP: 144 MMHG
PROTOCOL NAME: NORMAL
TARGET HR FORMULA: NORMAL
TEST INDICATION: NORMAL
TIME IN EXERCISE PHASE: NORMAL

## 2019-10-03 PROCEDURE — 96372 THER/PROPH/DIAG INJ SC/IM: CPT | Performed by: PSYCHIATRY & NEUROLOGY

## 2019-10-03 RX ADMIN — CYANOCOBALAMIN 1000 MCG: 1000 INJECTION INTRAMUSCULAR; SUBCUTANEOUS at 07:47

## 2019-10-03 NOTE — PROGRESS NOTES
Patient presented to office for her fourth B12 injection  With direct order from Kristi Horowitz PA-C I injected patient with 1,000 mcg Cyanocobalamin into right deltoid  Patient tolerated injection

## 2019-10-10 ENCOUNTER — CONSULT (OUTPATIENT)
Dept: CARDIOLOGY CLINIC | Facility: CLINIC | Age: 49
End: 2019-10-10
Payer: COMMERCIAL

## 2019-10-10 VITALS
WEIGHT: 140 LBS | OXYGEN SATURATION: 98 % | HEIGHT: 64 IN | BODY MASS INDEX: 23.9 KG/M2 | DIASTOLIC BLOOD PRESSURE: 80 MMHG | SYSTOLIC BLOOD PRESSURE: 130 MMHG | HEART RATE: 98 BPM

## 2019-10-10 DIAGNOSIS — R94.39 ABNORMAL CARDIOVASCULAR STRESS TEST: ICD-10-CM

## 2019-10-10 PROCEDURE — 99243 OFF/OP CNSLTJ NEW/EST LOW 30: CPT | Performed by: INTERNAL MEDICINE

## 2019-10-10 NOTE — PROGRESS NOTES
Cardiology Consultation        Linda Mon      1970      [id number]      Discussion/Summary:    1  False positive treadmill exercise stress test:  This occurred in the setting of baseline ST and T-wave abnormalities, in this young woman with no exertional symptoms, an excellent functional capacity, and no major cardiovascular risk factors  There were no regional wall motion abnormalities on post exercise echocardiography  Her ECG was personally reviewed, and although she does have subtle ST segment depression, much of this is a slow upsloping ST segment depression which quickly resolves upon cessation of exercise  All of these considerations suggest a false positive treadmill exercise stress test   I do not recommend any repeat ischemic evaluation in light of low pretest probability and low clinical suspicion  Lindsay Lee has been reassured  I have advocated a continued heart healthy diet, exercise, maintenance of a normal weight, and avoidance of tobacco     Follow up PRN        Interval History: This is a very pleasant 51-year-old female who underwent a routine ECG for symptoms of fatigue and dizziness at her neurologist's office  This revealed sinus rhythm with nonspecific ST and T-wave abnormalities  Subsequently she underwent a stress echocardiogram as requested by her PCP on 10/02/2019  This revealed 2 mm ST segment depression with exercise, which improved quickly upon resolution of exercise  She had no chest discomfort or symptoms with exercise  She exercised for 10 minutes and 30 seconds on Dino protocol  There were no echocardiographic abnormalities after exercise  Lindsay Lee presents today for cardiac consultation in light of her equivocal stress echocardiogram   Once again, from a symptomatic standpoint she feels excellent  She usually exercises 2-3 days a week at least feels very well with exercise without exertional symptoms    She has no myocardial risk factors of hypertension, diabetes, dyslipidemia, tobacco use, or any family history of coronary disease  She is lifelong nonsmoker               Vitals:  Vitals:    10/10/19 1346   BP: 130/80   BP Location: Right arm   Patient Position: Sitting   Cuff Size: Adult   Pulse: 98   SpO2: 98%   Weight: 63 5 kg (140 lb)   Height: 5' 4" (1 626 m)         Past Medical History:   Diagnosis Date    Anxiety and depression     BRCA positive     Breast cancer (HCC)     Easy bruising     Easy bruising tendency     Gastritis     Hyperthyroidism     Melanoma ([facility] [address] )     1999, inner right thigh    Rosacea      Social History     Socioeconomic History    Marital status: Single     Spouse name: Not on file    Number of children: Not on file    Years of education: Not on file    Highest education level: Not on file   Occupational History    Not on file   Social Needs    Financial resource strain: Not on file    Food insecurity:     Worry: Not on file     Inability: Not on file    Transportation needs:     Medical: Not on file     Non-medical: Not on file   Tobacco Use    Smoking status: Never Smoker    Smokeless tobacco: Never Used   Substance and Sexual Activity    Alcohol use: Yes     Frequency: Monthly or less     Comment: varies    Drug use: No    Sexual activity: Not on file   Lifestyle    Physical activity:     Days per week: Not on file     Minutes per session: Not on file    Stress: Not on file   Relationships    Social connections:     Talks on phone: Not on file     Gets together: Not on file     Attends Alevism service: Not on file     Active member of club or organization: Not on file     Attends meetings of clubs or organizations: Not on file     Relationship status: Not on file    Intimate partner violence:     Fear of current or ex partner: Not on file     Emotionally abused: Not on file     Physically abused: Not on file     Forced sexual activity: Not on file   Other Topics Concern    Not on file   Social History Narrative    Not on file      Family History   Problem Relation Age of Onset    Breast cancer Mother     Breast cancer Maternal Aunt     Thyroid disease Maternal Aunt     Liver cancer Father     Kidney cancer Maternal Grandmother     Heart attack Paternal Aunt         Atrial myocardial infarction    Diabetes Paternal [de-identified]     Colon cancer Family     Lung cancer Family      Past Surgical History:   Procedure Laterality Date    BREAST SURGERY      Reported history of breast surgery for biopsy    COLONOSCOPY      HYSTERECTOMY      KNEE SURGERY      MASTECTOMY      OOPHORECTOMY      PELVIC LAPAROSCOPY      [place] COLONOSCOPY FLX DX W/COLLJ SPEC WHEN PFRMD N/A 4/16/2019    Procedure: COLONOSCOPY;  Surgeon: Stanford Reyes MD;  [facility]; Service: Gastroenterology    IL ESOPHAGOGASTRODUODENOSCOPY TRANSORAL DIAGNOSTIC N/A 4/16/2019    Procedure: ESOPHAGOGASTRODUODENOSCOPY (EGD); Surgeon: Stanford Reyes MD;  [facility];   Service: Gastroenterology    SENTINEL LYMPH NODE BIOPSY      SIMPLE MASTECTOMY Bilateral     SKIN LESION EXCISION Right     Excision of lesion lower extremity        Current Outpatient Medications:     azelaic acid (AZELEX) 20 % cream, Apply 1 application topically 2 (two) times a day, Disp: , Rfl:     azelastine (ASTELIN) 0 1 % nasal spray, 1 spray into each nostril 2 (two) times a day Use in each nostril as directed, Disp: , Rfl:     Calcium Carb-Cholecalciferol (CALCIUM 500 + D) 500-200 MG-UNIT TABS, Take 1 tablet by mouth 2 (two) times a day , Disp: , Rfl:     cholecalciferol (VITAMIN D3) 1,000 units tablet, Take 1,000 Units by mouth 2 (two) times a day, Disp: , Rfl:     conjugated estrogens (PREMARIN) 0 625 mg tablet, Take 1 tablet (0 625 mg total) by mouth daily, Disp: 90 tablet, Rfl: 1    Cyanocobalamin (B-12) 1000 MCG SUBL, Place 1,000 mcg under the tongue, Disp: , Rfl:     diclofenac potassium (CATAFLAM) 50 mg tablet, 1 at the onset of a moderate to severe headache with food, t i d  P r n , Disp: 10 tablet, Rfl: 0    fluticasone (FLONASE) 50 mcg/act nasal spray, 1 spray into each nostril daily, Disp: , Rfl:     methimazole (TAPAZOLE) 5 mg tablet, TAKE ONE TABLET BY MOUTH EVERY OTHER DAY, Disp: 45 tablet, Rfl: 6    prochlorperazine (COMPAZINE) 10 mg tablet, 1 tab at onset of migraine, can repeat in 8 hours, can take with triptan/NSAID, Disp: 10 tablet, Rfl: 0    Riboflavin (B-2) 100 MG TABS, Take 100 mg by mouth 2 (two) times a day, Disp: , Rfl:     rizatriptan (MAXALT) 10 MG tablet, 1 tablet the onset of migraine may repeat 2nd dose after 2 hours, Disp: 27 tablet, Rfl: 1    venlafaxine (EFFEXOR-XR) 37 5 mg 24 hr capsule, TAKE ONE CAPSULE BY MOUTH EVERY DAY, Disp: 90 capsule, Rfl: 1    Current Facility-Administered Medications:     cyanocobalamin injection 1,000 mcg, 1,000 mcg, Intramuscular, Q30 Days, Jennifer Rocha PA-C, 1,000 mcg at 09/11/19 0757    cyanocobalamin injection 1,000 mcg, 1,000 mcg, Intramuscular, Weekly, Jeffrey Coffey PA-C, 1,000 mcg at 10/03/19 5426        Review of Systems:  Review of Systems   Constitutional: Negative for activity change, fever and unexpected weight change  HENT: Negative for facial swelling, nosebleeds and voice change  Respiratory: Negative for chest tightness, shortness of breath and wheezing  Cardiovascular: Negative for chest pain, palpitations and leg swelling  Gastrointestinal: Negative for abdominal distention  Genitourinary: Negative for hematuria  Musculoskeletal: Negative for arthralgias  Skin: Negative for color change, pallor, rash and wound  Neurological: Negative for dizziness, seizures and syncope  Psychiatric/Behavioral: Negative for agitation  Physical Exam:  Physical Exam   Constitutional: She is oriented to person, place, and time  She appears well-developed and well-nourished  HENT:   Head: Normocephalic and atraumatic     Eyes: EOM are normal    Neck: Normal range of motion  Neck supple  Cardiovascular: Normal rate, regular rhythm, normal heart sounds and intact distal pulses  Pulmonary/Chest: Effort normal and breath sounds normal    Abdominal: Soft  Musculoskeletal: Normal range of motion  Neurological: She is alert and oriented to person, place, and time  Skin: Skin is warm and dry  Psychiatric: She has a normal mood and affect  Her behavior is normal  Judgment and thought content normal    Vitals reviewed  This note was completed in part utilizing [employer] Direct Software  Grammatical errors, random word insertions, spelling mistakes, and incomplete sentences can be an occasional consequence of this system secondary to software limitations, ambient noise, and hardware issues  If you have any questions or concerns about the content, text, or information contained within the body of this dictation, please contact the provider for clarification

## 2019-10-16 DIAGNOSIS — N95.1 SYMPTOMATIC MENOPAUSAL OR FEMALE CLIMACTERIC STATES: ICD-10-CM

## 2019-10-16 RX ORDER — CONJUGATED ESTROGENS 0.62 MG/1
TABLET, FILM COATED ORAL
Qty: 90 TABLET | Refills: 0 | Status: SHIPPED | OUTPATIENT
Start: 2019-10-16 | End: 2019-11-29 | Stop reason: ALTCHOICE

## 2019-11-05 NOTE — TELEPHONE ENCOUNTER
[facility] nurse schedule now open for 2020  Joby Rosales,     Please assist patient in scheduling her next 3 monthly B12 injections  Patient will need appts in January, February and March 2020      Thanks,    Neema George

## 2019-11-06 ENCOUNTER — CLINICAL SUPPORT (OUTPATIENT)
Dept: NEUROLOGY | Facility: CLINIC | Age: 49
End: 2019-11-06
Payer: COMMERCIAL

## 2019-11-06 ENCOUNTER — HOSPITAL ENCOUNTER (OUTPATIENT)
Dept: RADIOLOGY | Facility: HOSPITAL | Age: 49
Discharge: HOME | End: 2019-11-06
Attending: ORTHOPAEDIC SURGERY
Payer: COMMERCIAL

## 2019-11-06 ENCOUNTER — OFFICE VISIT (OUTPATIENT)
Dept: OBGYN CLINIC | Facility: HOSPITAL | Age: 49
End: 2019-11-06
Payer: COMMERCIAL

## 2019-11-06 VITALS
BODY MASS INDEX: 24.07 KG/M2 | SYSTOLIC BLOOD PRESSURE: 125 MMHG | DIASTOLIC BLOOD PRESSURE: 85 MMHG | HEIGHT: 64 IN | HEART RATE: 80 BPM | WEIGHT: 141 LBS

## 2019-11-06 DIAGNOSIS — M22.42 CHONDROMALACIA OF PATELLOFEMORAL JOINT, LEFT: Primary | ICD-10-CM

## 2019-11-06 DIAGNOSIS — M25.562 LEFT KNEE PAIN, UNSPECIFIED CHRONICITY: ICD-10-CM

## 2019-11-06 DIAGNOSIS — E53.8 VITAMIN B12 DEFICIENCY: ICD-10-CM

## 2019-11-06 PROCEDURE — 99203 OFFICE O/P NEW LOW 30 MIN: CPT | Performed by: ORTHOPAEDIC SURGERY

## 2019-11-06 PROCEDURE — 96372 THER/PROPH/DIAG INJ SC/IM: CPT | Performed by: PSYCHIATRY & NEUROLOGY

## 2019-11-06 PROCEDURE — 73562 X-RAY EXAM OF KNEE 3: CPT

## 2019-11-06 RX ADMIN — CYANOCOBALAMIN 1000 MCG: 1000 INJECTION INTRAMUSCULAR; SUBCUTANEOUS at 08:30

## 2019-11-06 NOTE — PROGRESS NOTES
Procedures  After obtaining consent and direct order from Tania Jerry PA-C I injected 1,000mcg of Vitamin B12 into the right deltoid  Patient was instructed to stay in clinic for 15-20 minutes  No adverse reaction was noted  Patient with follow up in December for next injection

## 2019-11-06 NOTE — PROGRESS NOTES
Assessment:   Diagnosis ICD-10-CM Associated Orders   1  Chondromalacia of patellofemoral joint, left M22 42    2  Left knee pain, unspecified chronicity M25 562 XR knee 3 vw left non injury       Plan:  Georgette is diagnosed with left knee patellofemoral chondromalacia  Treatment options were discussed in detail with patient  She was offered a CSI and declined stating she does not feel like her symptoms warrant an injection  She was given home exercises to do such as straight leg raises  She can continue activity to tolerance  She can take OTCs for pain control as needed  If symptoms persist at next visit she may benefit from a CSI of the left knee  To do next visit:  Return in about 3 months (around 2/6/2020) for Recheck  The above stated was discussed in layman's terms and the patient expressed understanding  All questions were answered to the patient's satisfaction  Scribe Attestation    I,:   Kal Aguila am acting as a scribe while in the presence of the attending physician :        I,:   Louie Gonsales MD personally performed the services described in this documentation    as scribed in my presence :              Subjective:   Binh Hollingsworth is a 54 y o  female who presents in the office with a complaint of left knee pain x 6 months  She denies any injury mechanism  She states when walking on even ground it feel like her knee cap is catching and feels a stabbing pain when this occurs  She denies locking, popping, clicking  She admits audible creaking of her knee which has increased over the last 6 months  She has taken no medication for treatment        Review of systems negative unless otherwise specified in HPI    Past Medical History:   Diagnosis Date    Anxiety and depression     BRCA positive     Breast cancer ([facility] [address] [facility]     Easy bruising     Easy bruising tendency     Gastritis     Hyperthyroidism     Melanoma ([facility] [address] )     1999, inner right thigh    Rosacea        Past Surgical History:   Procedure Laterality Date    BREAST SURGERY      Reported history of breast surgery for biopsy    COLONOSCOPY      HYSTERECTOMY      KNEE SURGERY      MASTECTOMY      OOPHORECTOMY      PELVIC LAPAROSCOPY      MI COLONOSCOPY FLX DX W/COLLJ SPEC WHEN PFRMD N/A 4/16/2019    Procedure: COLONOSCOPY;  Surgeon: Alexandria Lott MD;  Location: [facility]; Service: Gastroenterology    MI ESOPHAGOGASTRODUODENOSCOPY TRANSORAL DIAGNOSTIC N/A 4/16/2019    Procedure: ESOPHAGOGASTRODUODENOSCOPY (EGD); Surgeon: Alexandria Lott MD;  Location: [facility];   Service: Gastroenterology    SENTINEL LYMPH NODE BIOPSY      SIMPLE MASTECTOMY Bilateral     SKIN LESION EXCISION Right     Excision of lesion lower extremity        Family History   Problem Relation Age of Onset    Breast cancer Mother     Breast cancer Maternal Aunt     Thyroid disease Maternal Aunt     Liver cancer Father     Kidney cancer Maternal Grandmother     Heart attack Paternal Aunt         Atrial myocardial infarction    Diabetes Paternal Aunt     Colon cancer Family     Lung cancer Family        Social History     Occupational History    Not on file   Tobacco Use    Smoking status: Never Smoker    Smokeless tobacco: Never Used   Substance and Sexual Activity    Alcohol use: Yes     Frequency: Monthly or less     Comment: varies    Drug use: No    Sexual activity: Not on file         Current Outpatient Medications:     azelaic acid (AZELEX) 20 % cream, Apply 1 application topically 2 (two) times a day, Disp: , Rfl:     azelastine (ASTELIN) 0 1 % nasal spray, 1 spray into each nostril 2 (two) times a day Use in each nostril as directed, Disp: , Rfl:     Calcium Carb-Cholecalciferol (CALCIUM 500 + D) 500-200 MG-UNIT TABS, Take 1 tablet by mouth 2 (two) times a day , Disp: , Rfl:     cholecalciferol (VITAMIN D3) 1,000 units tablet, Take 1,000 Units by mouth 2 (two) times a day, Disp: , Rfl:     conjugated estrogens (PREMARIN) 0 625 mg tablet, Take 1 tablet (0 625 mg total) by mouth daily, Disp: 90 tablet, Rfl: 1    Cyanocobalamin (B-12) 1000 MCG SUBL, Place 1,000 mcg under the tongue, Disp: , Rfl:     diclofenac potassium (CATAFLAM) 50 mg tablet, 1 at the onset of a moderate to severe headache with food, t i d  P r n , Disp: 10 tablet, Rfl: 0    fluticasone (FLONASE) 50 mcg/act nasal spray, 1 spray into each nostril daily, Disp: , Rfl:     methimazole (TAPAZOLE) 5 mg tablet, TAKE ONE TABLET BY MOUTH EVERY OTHER DAY, Disp: 45 tablet, Rfl: 6    prochlorperazine (COMPAZINE) 10 mg tablet, 1 tab at onset of migraine, can repeat in 8 hours, can take with triptan/NSAID, Disp: 10 tablet, Rfl: 0    Riboflavin (B-2) 100 MG TABS, Take 100 mg by mouth 2 (two) times a day, Disp: , Rfl:     rizatriptan (MAXALT) 10 MG tablet, 1 tablet the onset of migraine may repeat 2nd dose after 2 hours, Disp: 27 tablet, Rfl: 1    venlafaxine (EFFEXOR-XR) 37 5 mg 24 hr capsule, TAKE ONE CAPSULE BY MOUTH EVERY DAY, Disp: 90 capsule, Rfl: 1    PREMARIN 0 625 MG tablet, TAKE ONE TABLET BY MOUTH EVERY DAY, Disp: 90 tablet, Rfl: 0    Current Facility-Administered Medications:     cyanocobalamin injection 1,000 mcg, 1,000 mcg, Intramuscular, Q30 Days, Jennifer Rocha PA-C, 1,000 mcg at 11/06/19 0830    cyanocobalamin injection 1,000 mcg, 1,000 mcg, Intramuscular, Weekly, Jaye Osuna PA-C, 1,000 mcg at 10/03/19 4336    Allergies   Allergen Reactions    Cat Hair Extract Sneezing    Dust Mite Extract Sneezing    Molds & Smuts Sneezing    Morphine Nausea Only, Vomiting and Drowsiness            Vitals:    11/06/19 1457   BP: 125/85   Pulse: 80       Objective:                    Left Knee Exam     Muscle Strength   The patient has normal left knee strength  Tenderness   The patient is experiencing tenderness in the patella and patellar tendon      Range of Motion   Extension: normal   Flexion: normal Left knee flexion: audible and palpable crepitus present with rom  Tests   Zeny:  Medial - negative Lateral - negative  Varus: negative Valgus: negative  Lachman:  Posterior - negative  Drawer:  Anterior - negative     Posterior - negative    Other   Erythema: absent  Scars: absent  Sensation: normal  Pulse: present  Swelling: none  Effusion: no effusion present    Comments:  Genu Valgum alignment present  Diagnostics, reviewed and taken today if performed as documented:    None performed      The attending physician has personally reviewed the pertinent films in PACS and interpretation is as follows: Xray left knee demonstrates no fracture or dislocation  Patellofemoral space narrowing present  Mild degenerative changes present  Procedures, if performed today:    Procedures    None performed        Scribe Attestation    I,:   Huyen Tony am acting as a scribe while in the presence of the attending physician :        I,:   Jad Muro MD personally performed the services described in this documentation    as scribed in my presence :          Portions of the record may have been created with voice recognition software  Occasional wrong word or "sound a like" substitutions may have occurred due to the inherent limitations of voice recognition software  Read the chart carefully and recognize, using context, where substitutions have occurred

## 2019-11-12 ENCOUNTER — TELEPHONE (OUTPATIENT)
Dept: OBGYN CLINIC | Facility: HOSPITAL | Age: 49
End: 2019-11-12

## 2019-11-12 DIAGNOSIS — M22.42 CHONDROMALACIA OF PATELLOFEMORAL JOINT, LEFT: Primary | ICD-10-CM

## 2019-11-12 NOTE — TELEPHONE ENCOUNTER
Patient is calling asking if she can get a script for physical therapy  Please let the patient know when it is in her chart     [phone]

## 2019-11-20 ENCOUNTER — EVALUATION (OUTPATIENT)
Dept: PHYSICAL THERAPY | Facility: REHABILITATION | Age: 49
End: 2019-11-20
Payer: COMMERCIAL

## 2019-11-20 DIAGNOSIS — M22.42 CHONDROMALACIA OF PATELLOFEMORAL JOINT, LEFT: ICD-10-CM

## 2019-11-20 PROCEDURE — 97161 PT EVAL LOW COMPLEX 20 MIN: CPT | Performed by: PHYSICAL THERAPIST

## 2019-11-20 PROCEDURE — 97140 MANUAL THERAPY 1/> REGIONS: CPT | Performed by: PHYSICAL THERAPIST

## 2019-11-20 PROCEDURE — 97110 THERAPEUTIC EXERCISES: CPT | Performed by: PHYSICAL THERAPIST

## 2019-11-20 NOTE — PROGRESS NOTES
PT Evaluation     Today's date: 2019  Patient name: Carlos Salas  : 1970  MRN: [id number]  Referring provider: Yola Anthony MD  Dx:   Encounter Diagnosis     ICD-10-CM    1  Chondromalacia of patellofemoral joint, left M22 42 Ambulatory referral to Physical Therapy                  Assessment  Assessment details: Patient presents complaining of left knee pain which has been ongoing for about 6 months, she reports it has gotten worse the past couple weeks  She reports pain mostly when standing from a seated position  She reports having some clicking in her knee, mostly during the toe off stage of the gait pattern  She reports feeling that catching less so when going up and down steps  She reports all of her discomfort is deep to the patella, she describes this mostly as a jamming feeling  She currently goes to the gym normally and does mostly HIIT activities  She has a history of arthroscopic surgery following serial patellar dislocations  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, and has some issues with her knee pain being aggravated at work  She reports no pain in the medial and lateral knee  She reports no increase in activity or trauma prior to her pain beginning  Patient presents with normal range of motion, but has noted quad weakness, especially through VMO  Patient made aware of condition as well as the proposed treatment plan, including risks, benefits and alternatives  Impairments: abnormal or restricted ROM, activity intolerance, impaired physical strength, lacks appropriate home exercise program and pain with function     Prognosis: good    Goals  ST- demonstrate compliancy with HEP in 1 week     Report no pain within 3 weeks   Improve quad activation, within 3 weeks     LT- Improve FOTO score to specified value to improve patients perceived benefit of therapy, in 8 weeks   Be able to return to gym with no complaints of pain, within 8 weeks     Plan  Plan details: Physical therapy with focus on there ex and manual therapy to improve ability to complete tasks around the house and complete functional activities, use of modalities as needed     Patient would benefit from: skilled physical therapy  Referral necessary: No  Planned modality interventions: cryotherapy, TENS and thermotherapy: hydrocollator packs  Planned therapy interventions: ADL training, balance, balance/weight bearing training, gait training, manual therapy, joint mobilization, neuromuscular re-education, strengthening, stretching, therapeutic activities and therapeutic exercise  Frequency: 2x week  Duration in weeks: 8  Plan of Care beginning date: 2019  Plan of Care expiration date: 1/15/2020  Treatment plan discussed with: patient        Subjective Evaluation    History of Present Illness  Date of onset: 2019  Mechanism of injury: Chronic onset   Pain  Current pain ratin  At best pain ratin  At worst pain ratin  Location: L knee   Quality: dull ache  Relieving factors: relaxation and rest  Aggravating factors: standing and walking    Treatments  No previous or current treatments  Patient Goals  Patient goals for therapy: decreased pain and independence with ADLs/IADLs  Patient goal: get back to the gym         Objective     General Comments:      Knee Comments  Ttp reported deep to patella with no complaints medially, laterally or posteriorly     rom  L knee WFL   R knee WFL    mmt  Hip flexion L=4/5 R=4+/5   Hip abduction 4/5 B/L  Hip adduction 4+/5 B/L  Knee extension L=4/5 R=4+/5   Knee flexion 4+/5 B/L    Special tests   (+) [employer]     Joint play notably hyper mobile              Precautions: hx breast cancer, depression, anxiety       Manual               Teja taping and education CW 8'                                                                     Exercise Diary              Bike             SLR (neutral, ER)             LAQ             Step downs (fwd, lat) Bridges w/ TB             SL joseph              Sumo squats                                                                                                                                                                                           Modalities

## 2019-11-21 DIAGNOSIS — N95.1 MENOPAUSAL SYMPTOMS: ICD-10-CM

## 2019-11-21 NOTE — TELEPHONE ENCOUNTER
Patient called requesting refill on Venlafaxine 37 5 mg 90 day supply with refills  Patient would like it to be sent to [facility] in [address] [facility]

## 2019-11-22 RX ORDER — VENLAFAXINE HYDROCHLORIDE 37.5 MG/1
37.5 CAPSULE, EXTENDED RELEASE ORAL DAILY
Qty: 90 CAPSULE | Refills: 1 | Status: SHIPPED | OUTPATIENT
Start: 2019-11-22

## 2019-11-26 ENCOUNTER — OFFICE VISIT (OUTPATIENT)
Dept: DERMATOLOGY | Facility: CLINIC | Age: 49
End: 2019-11-26
Payer: COMMERCIAL

## 2019-11-26 VITALS — BODY MASS INDEX: 23.39 KG/M2 | TEMPERATURE: 98.6 F | HEIGHT: 64 IN | WEIGHT: 137 LBS

## 2019-11-26 DIAGNOSIS — E05.90 SUBCLINICAL HYPERTHYROIDISM: ICD-10-CM

## 2019-11-26 DIAGNOSIS — L57.8 ACTINIC SKIN DAMAGE: ICD-10-CM

## 2019-11-26 DIAGNOSIS — L71.9 ROSACEA: ICD-10-CM

## 2019-11-26 DIAGNOSIS — D48.5 NEOPLASM OF UNCERTAIN BEHAVIOR OF SKIN: Primary | ICD-10-CM

## 2019-11-26 DIAGNOSIS — L72.0 MILIA: ICD-10-CM

## 2019-11-26 DIAGNOSIS — L21.9 SEBORRHEIC DERMATITIS: ICD-10-CM

## 2019-11-26 DIAGNOSIS — Z85.820 HISTORY OF MELANOMA: ICD-10-CM

## 2019-11-26 DIAGNOSIS — E05.20 TOXIC MULTINODULAR GOITER: ICD-10-CM

## 2019-11-26 PROCEDURE — 11102 TANGNTL BX SKIN SINGLE LES: CPT | Performed by: DERMATOLOGY

## 2019-11-26 PROCEDURE — 88342 IMHCHEM/IMCYTCHM 1ST ANTB: CPT | Performed by: STUDENT IN AN ORGANIZED HEALTH CARE EDUCATION/TRAINING PROGRAM

## 2019-11-26 PROCEDURE — 88341 IMHCHEM/IMCYTCHM EA ADD ANTB: CPT | Performed by: STUDENT IN AN ORGANIZED HEALTH CARE EDUCATION/TRAINING PROGRAM

## 2019-11-26 PROCEDURE — 88305 TISSUE EXAM BY PATHOLOGIST: CPT | Performed by: STUDENT IN AN ORGANIZED HEALTH CARE EDUCATION/TRAINING PROGRAM

## 2019-11-26 PROCEDURE — 99999 PR OFFICE/OUTPT VISIT,PROCEDURE ONLY: CPT | Performed by: DERMATOLOGY

## 2019-11-26 RX ORDER — METHIMAZOLE 5 MG/1
TABLET ORAL
Qty: 45 TABLET | Refills: 6 | Status: SHIPPED | OUTPATIENT
Start: 2019-11-26

## 2019-11-26 NOTE — PATIENT INSTRUCTIONS
INFORMED CONSENT DISCUSSION AND POST-OPERATIVE INSTRUCTIONS FOR PATIENT    I   RATIONALE FOR PROCEDURE  I understand that a skin biopsy allows the Dermatologist to test a lesion or rash under the microscope to obtain a diagnosis  It usually involves numbing the area with numbing medication and removing a small piece of skin; sometimes the area will be closed with sutures  In this specific procedure, sutures are not usually needed  If any sutures are placed, then they are usually need to be removed in 2 weeks or less  I understand that my Dermatologist recommends that a skin "shave" biopsy be performed today  A local anesthetic, similar to the kind that a dentist uses when filling a cavity, will be injected with a very small needle into the skin area to be sampled  The injected skin and tissue underneath "will go to sleep and become numb so no pain should be felt afterwards  An instrument shaped like a tiny "razor blade" (shave biopsy instrument) will be used to cut a small piece of tissue and skin from the area so that a sample of tissue can be taken and examined more closely under the microscope  A slight amount of bleeding will occur, but it will be stopped with direct pressure and a pressure bandage and any other appropriate methods  I understands that a scar will form where the wound was created  Surgical ointment will be applied to help protect the wound  Sutures are not usually needed      II   RISKS AND POTENTIAL COMPLICATIONS   I understand the risks and potential complications of a skin biopsy include but are not limited to the following:   Bleeding   Infection   Pain   Scar/keloid   Skin discoloration   Incomplete Removal   Recurrence   Nerve Damage/Numbness/Loss of Function   Allergic Reaction to Anesthesia   Biopsies are diagnostic procedures and based on findings additional treatment or evaluation may be required   Loss or destruction of specimen resulting in no additional findings    My Dermatologist has explained to me the nature of the condition, the nature of the procedure, and the benefits to be reasonably expected compared with alternative approaches  My Dermatologist has discussed the likelihood of major risks or complications of this procedure including the specific risks listed above, such as bleeding, infection, and scarring/keloid  I understand that a scar is expected after this procedure  I understand that my physician cannot predict if the scar will form a "keloid," which extends beyond the borders of the wound that is created  A keloid is a thick, painful, and bumpy scar  A keloid can be difficult to treat, as it does not always respond well to therapy, which includes injecting cortisone directly into the keloid every few weeks  While this usually reduces the pain and size of the scar, it does not eliminate it  I understand that photographs may be taken before and after the procedure  These will be maintained as part of the medical providers confidential records and may not be made available to me  I further authorize the medical provider to use the photographs for teaching purposes or to illustrate scientific papers, books, or lectures if in his/her judgment, medical research, education, or science may benefit from its use  I have had an opportunity to fully inquire about the risks and benefits of this procedure and its alternatives  I have been given ample time and opportunity to ask questions and to seek a second opinion if I wished to do so  I acknowledge that there have specifically been no guarantees as to the cosmetic results from the procedure  I am aware that with any procedure there is always the possibility of an unexpected complication  III  POST-PROCEDURAL CARE (WHAT YOU WILL NEED TO DO "AFTER THE BIOPSY" TO OPTIMIZE HEALING)     Keep the area clean and dry    Try NOT to remove the bandage or get it wet for the first 24 hours      Gently clean the area and apply surgical ointment (such as Vaseline petrolatum ointment, which is available "over the counter" and not a prescription) to the biopsy site for up to 2 weeks straight  This acts to protect the wound from the outside world   Sutures are not usually placed in this procedure  If any sutures were placed, return for suture removal as instructed (generally 1 week for the face, 2 weeks for the body)   Take Acetaminophen (Tylenol) for discomfort, if no contraindications  Ibuprofen or aspirin could make bleeding worse   Call our office immediately for signs of infection: fever, chills, increased redness, warmth, tenderness, discomfort/pain, or pus or foul smell coming from the wound  WHAT TO DO IF THERE IS ANY BLEEDING? If a small amount of bleeding is noticed, place a clean cloth over the area and apply firm pressure for ten minutes  Check the wound after 10 minutes of direct pressure  If bleeding persists, try one more time for an additional 10 minutes of direct pressure on the area  If the bleeding becomes heavier or does not stop after the second attempt, or if you have any other questions about this procedure, then please call your [facility]'s Dermatologist by calling [phone] (SKIN)  I hereby acknowledge that I have reviewed and verified the site with my Dermatologist and have requested and authorized my Dermatologist to proceed with the procedure

## 2019-11-26 NOTE — PROGRESS NOTES
[facility] Follow Up Note    Patient Name: Elaine Trevino  Encounter Date: 11/26/19    Today's Chief Concerns:  Julieann Frankel Concern #1:  Full body exam history of MM   Concern #2:  Lesion on left inner thigh       Current Medications:    Current Outpatient Medications:     azelaic acid (AZELEX) 20 % cream, Apply 1 application topically 2 (two) times a day, Disp: , Rfl:     azelastine (ASTELIN) 0 1 % nasal spray, 1 spray into each nostril 2 (two) times a day Use in each nostril as directed, Disp: , Rfl:     Calcium Carb-Cholecalciferol (CALCIUM 500 + D) 500-200 MG-UNIT TABS, Take 1 tablet by mouth 2 (two) times a day , Disp: , Rfl:     cholecalciferol (VITAMIN D3) 1,000 units tablet, Take 1,000 Units by mouth 2 (two) times a day, Disp: , Rfl:     conjugated estrogens (PREMARIN) 0 625 mg tablet, Take 1 tablet (0 625 mg total) by mouth daily, Disp: 90 tablet, Rfl: 1    Cyanocobalamin (B-12) 1000 MCG SUBL, Place 1,000 mcg under the tongue, Disp: , Rfl:     diclofenac potassium (CATAFLAM) 50 mg tablet, 1 at the onset of a moderate to severe headache with food, t i d  P r n , Disp: 10 tablet, Rfl: 0    fluticasone (FLONASE) 50 mcg/act nasal spray, 1 spray into each nostril daily, Disp: , Rfl:     methimazole (TAPAZOLE) 5 mg tablet, TAKE ONE TABLET BY MOUTH EVERY OTHER DAY, Disp: 45 tablet, Rfl: 6    PREMARIN 0 625 MG tablet, TAKE ONE TABLET BY MOUTH EVERY DAY, Disp: 90 tablet, Rfl: 0    prochlorperazine (COMPAZINE) 10 mg tablet, 1 tab at onset of migraine, can repeat in 8 hours, can take with triptan/NSAID, Disp: 10 tablet, Rfl: 0    Riboflavin (B-2) 100 MG TABS, Take 100 mg by mouth 2 (two) times a day, Disp: , Rfl:     rizatriptan (MAXALT) 10 MG tablet, 1 tablet the onset of migraine may repeat 2nd dose after 2 hours, Disp: 27 tablet, Rfl: 1    venlafaxine (EFFEXOR-XR) 37 5 mg 24 hr capsule, Take 1 capsule (37 5 mg total) by mouth daily, Disp: 90 capsule, Rfl: 1    Current Facility-Administered Medications:     cyanocobalamin injection 1,000 mcg, 1,000 mcg, Intramuscular, Q30 Days, Jennifer Rocha PA-C, 1,000 mcg at 11/06/19 0830    cyanocobalamin injection 1,000 mcg, 1,000 mcg, Intramuscular, Weekly, Yury Parrish PA-C, 1,000 mcg at 10/03/19 0747    CONSTITUTIONAL:   There were no vitals filed for this visit  Specific Alerts:    Have you been seen by a [facility] Dermatologist in the last 3 years? YES    Are you pregnant or planning to become pregnant? N/A    Are you currently or planning to be nursing or breast feeding? N/A    Allergies   Allergen Reactions    Cat Hair Extract Sneezing    Dust Mite Extract Sneezing    Molds & Smuts Sneezing    Morphine Nausea Only, Vomiting and Drowsiness       May we call your Preferred Phone number to discuss your specific medical information? YES    May we leave a detailed message that includes your specific medical information? YES    Have you traveled outside of the [facility] in the past 3 months? No    Do you currently have a pacemaker or defibrillator? No    Do you have any artificial heart valves, joints, plates, screws, rods, stents, pins, etc? No   - If Yes, were any placed within the last 2 years? Do you require any medications prior to a surgical procedure? No   - If Yes, for which procedure? N/A   - If Yes, what medications to you require? N/A    Are you taking any medications that cause you to bleed more easily ("blood thinners") No    Have you ever experienced a rapid heartbeat with epinephrine? No    Have you ever been treated with "gold" (gold sodium thiomalate) therapy? No    [facility] can help with wrinkles, "laugh lines," facial volume loss, "double chin," "love handles," age spots, and more  Are you interested in learning today about some of the skin enhancement procedures that we offer?  (If Yes, please provide more detail) No    Review of Systems:  Have you recently had or currently have any of the following? · Fever or chills: No  · Night Sweats: No  · Headaches: No  · Weight Gain: No  · Weight Loss: No  · Blurry Vision: No  · Nausea: No  · Vomiting: No  · Diarrhea: No  · Blood in Stool: No  · Abdominal Pain: No  · Itchy Skin: No  · Painful Joints: No  · Swollen Joints: No  · Muscle Pain: No  · Irregular Mole: No  · Sun Burn: No  · Dry Skin: YES  · Skin Color Changes: No  · Scar or Keloid: No  · Cold Sores/Fever Blisters: YES  · Bacterial Infections/MRSA: No  · Anxiety: No  · Depression: No  · Suicidal or Homicidal Thoughts: No      PSYCH: Normal mood and affect  EYES: Normal conjunctiva  ENT: Normal lips and oral mucosa  CARDIOVASCULAR: No edema  RESPIRATORY: Normal respirations  HEME/LYMPH/IMMUNO:  No regional lymphadenopathy except as noted below in ASSESSMENT AND PLAN BY DIAGNOSIS    FULL ORGAN SYSTEM SKIN EXAM (SKIN)  Hair, Scalp, Ears, Face Normal except as noted below in Assessment   Neck, Cervical Chain Nodes Normal except as noted below in Assessment   Right Arm/Hand/Fingers Normal except as noted below in Assessment   Left Arm/Hand/Fingers Normal except as noted below in Assessment   Chest/Breasts/Axillae Viewed areas Normal except as noted below in Assessment   Abdomen, Umbilicus Normal except as noted below in Assessment   Back/Spine Normal except as noted below in Assessment   Groin/Genitalia/Buttocks Viewed areas Normal except as noted below in Assessment   Right Leg, Foot, Toes Normal except as noted below in Assessment   Left Leg, Foot, Toes Normal except as noted below in Assessment       1  HISTORY OF MELANOMA    Physical Exam:   Anatomic Location Affected:   Inner right [employer] Morphological Description of Scar:  Well healed surgical linear scar    Year Treated: 1999   TNM Classification: N/A   Suspected Recurrence: no   Regional adenopathy: no    Assessment and Plan:  Based on a thorough discussion of this condition and the management approach to it (including a comprehensive discussion of the known risks, side effects and potential benefits of treatment), the patient (family) agrees to implement the following specific plan:   Continue Neutrogena daily defense SPF 50+ at least 2-3 times a day   6 months skin exam    What happens at follow-up? The main purpose of follow-up is to detect recurrences early (metastatic melanoma), but it also offers an opportunity to diagnose a new primary melanoma at the first possible opportunity  A second invasive melanoma occurs in 5-10% of melanoma patients and a new melanoma in situ is diagnosed in more than 20% of melanoma patients  Our practice makes the following recommendations for follow-up for patients with invasive melanoma   At-least "monthly" self-skin examinations    Routine skin checks by a board certified dermatologist   Follow-up intervals are "every 3 months" within 2 years of a new melanoma diagnosis; "every 6 months" between 2-4 years of a new melanoma diagnosis; and "annually" after 4 years of a new melanoma diagnosis   Individual patient's needs should be considered before an appropriate follow-up is offered   Provide education and support to help the patient adjust to their illness    Follow-up appointments should include:   A check of the scar where the primary melanoma was removed   Checking the regional lymph nodes   A general skin examination   A full physical examination at least annually by your primary care physician    In those with more advanced primary disease, follow-up may include:   Blood tests   Imaging: ultrasound, X-ray, CT, MRI and PET scan  Most tests are not worthwhile for patients with stage 1 or 2 melanoma unless there are signs or symptoms of disease recurrence or metastasis  No tests are necessary for healthy patients who have remained well for five years or longer after removal of their melanoma  What is the outlook for patients with melanoma?    Melanoma in situ is cured by excision because it has no potential to spread around the body   The risk of spread and ultimate death from invasive melanoma depends on several factors, but the main one is the Breslow thickness of the melanoma at the time it was surgically removed   Metastases are rare for melanomas < 0 75 mm and the risk for tumours 0 75-1 mm thick is about 5%  The risk steadily increases with thickness so that melanomas > 4 mm have a risk of metastasis of about 40%  Melanoma is a potentially serious type of skin cancer, in which there is uncontrolled growth of melanocytes (pigment cells)  Melanoma is sometimes called malignant melanoma  Normal melanocytes are found in the basal layer of the epidermis (the outer layer of skin)  Melanocytes produce a protein called melanin, which protects skin cells by absorbing ultraviolet (UV) radiation  Melanocytes are found in equal numbers in black and white skin, but melanocytes in black skin produce much more melanin  People with dark brown or black skin are very much less likely to be damaged by UV radiation than those with white skin      2  ACTINIC DAMAGE (Chronic Ultraviolet Radiation Exposure)    Physical Exam:   Anatomic Location Affected:  Trunk and extremities    Morphological Description:  Mottled (hyper- and hypo-pigmented), slightly atrophic skin with overlying telangiectasia    Assessment and Plan:  Based on a thorough discussion of this condition and the management approach to it (including a comprehensive discussion of the known risks, side effects and potential benefits of treatment), the patient (family) agrees to implement the following specific plan:   Continue Neutrogena daily defense SPF 50+ at least 2-3 times a day   6 months skin exam     Photo-aging and actinic damage of skin is common on sites repeatedly exposed to the sun, especially the backs of the hands and the face, most often affecting the ears, nose, cheeks, upper lip, vermilion of the lower lip, temples, forehead and balding scalp  In severely chronically sun-exposed individuals, this condition may also be found on the upper trunk, upper and lower limbs, and dorsum of feet  Photo-aging induces cutaneous changes that vary among individuals, reflecting inherent differences in vulnerability to sun exposure and repair capacity  We discussed further steps to minimize or avoid UV exposure:     Be aware of daily UV index levels  In the [facility], this index is often reported on the [address]   Avoid outdoor activities during the middle of the day   Wear sun-protective clothing (e g , UPF-rated, broad-brimmed hats, long sleeves, and trousers or skirts)   Apply a high sun protection factor (60+) broad-spectrum sunscreen moisturizer at least three times a day to affected areas, year-round  I recommended Neutrogena Daily Defense or CeraVe AM or Aveeno   Do not smoke, and where possible, avoid exposure to pollutants   Get plenty of exercise -- active people appear younger than inactive people   Eat fruit and vegetables daily   Many oral supplements with antioxidant and anti-inflammatory properties have been advocated to mitigate skin aging and to improve skin health  These include carotenoids; polyphenols; chlorophyll; aloe vera; vitamins B, C, and E; red ginseng; squalene; and omega-3 fatty acids  Their role in combatting skin aging is unclear     3  NEOPLASM OF UNCERTAIN BEHAVIOR OF SKIN    Physical Exam:   (Anatomic Location); (Size and Morphological Description); (Differential Diagnosis):  o Left thigh with a 0 2cm blanchable telangectasia; Differential: Probable Angioma rule out Atypical  Vascular Lesion   Pertinent Positives:   Pertinent Negatives: No lymphadenopathy    Additional History of Present Condition:  Left thigh  Present for years  Asymptomatic  No treatment attempted       Assessment and Plan:   I have discussed with the patient that a sample of skin via a "skin biopsy would be potentially helpful to further make a specific diagnosis under the microscope   Based on a thorough discussion of this condition and the management approach to it (including a comprehensive discussion of the known risks, side effects and potential benefits of treatment), the patient (family) agrees to implement the following specific plan:    o Procedure:  Skin Biopsy  After a thorough discussion of treatment options and risk/benefits/alternatives (including but not limited to local pain, scarring, dyspigmentation, blistering, possible superinfection, and inability to confirm a diagnosis via histopathology), verbal and written consent were obtained and portion of the rash was biopsied for tissue sample  See below for consent that was obtained from patient and subsequent Procedure Note  PROCEDURE SHAVE BIOPSY NOTE:     Performing Physician: Dr Connolly   Anatomic Location; Clinical Description with size (cm); Pre-Op Diagnosis:   o Left thigh with a 0 2cm blanchable telangectasia; Differential: Probable Angioma rule out Atypical  Vascular Lesion   Post-op diagnosis: Same      Local anesthesia: 1% xylocaine with epi       Topical anesthesia: None     Hemostasis: Aluminum chloride       After obtaining informed consent  at which time there was a discussion about the purpose of biopsy  and low risks of infection and bleeding  The area was prepped and draped in the usual fashion  Anesthesia was obtained with 1% lidocaine with epinephrine  A shave biopsy to an appropriate sampling depth was obtained with a sterile blade (such as a 15-blade or DermaBlade)  The resulting wound was covered with surgical ointment and bandaged appropriately  The patient tolerated the procedure well without complications and was without signs of functional compromise  Specimen has been sent for review by Dermatopathology      Standard post-procedure care has been explained and has been included in written form within the patient's copy of Informed Consent  INFORMED CONSENT DISCUSSION AND POST-OPERATIVE INSTRUCTIONS FOR PATIENT    I   RATIONALE FOR PROCEDURE  I understand that a skin biopsy allows the Dermatologist to test a lesion or rash under the microscope to obtain a diagnosis  It usually involves numbing the area with numbing medication and removing a small piece of skin; sometimes the area will be closed with sutures  In this specific procedure, sutures are not usually needed  If any sutures are placed, then they are usually need to be removed in 2 weeks or less  I understand that my Dermatologist recommends that a skin "shave" biopsy be performed today  A local anesthetic, similar to the kind that a dentist uses when filling a cavity, will be injected with a very small needle into the skin area to be sampled  The injected skin and tissue underneath "will go to sleep and become numb so no pain should be felt afterwards  An instrument shaped like a tiny "razor blade" (shave biopsy instrument) will be used to cut a small piece of tissue and skin from the area so that a sample of tissue can be taken and examined more closely under the microscope  A slight amount of bleeding will occur, but it will be stopped with direct pressure and a pressure bandage and any other appropriate methods  I understands that a scar will form where the wound was created  Surgical ointment will be applied to help protect the wound  Sutures are not usually needed      II   RISKS AND POTENTIAL COMPLICATIONS   I understand the risks and potential complications of a skin biopsy include but are not limited to the following:   Bleeding   Infection   Pain   Scar/keloid   Skin discoloration   Incomplete Removal   Recurrence   Nerve Damage/Numbness/Loss of Function   Allergic Reaction to Anesthesia   Biopsies are diagnostic procedures and based on findings additional treatment or evaluation may be required   Loss or destruction of specimen resulting in no additional findings    My Dermatologist has explained to me the nature of the condition, the nature of the procedure, and the benefits to be reasonably expected compared with alternative approaches  My Dermatologist has discussed the likelihood of major risks or complications of this procedure including the specific risks listed above, such as bleeding, infection, and scarring/keloid  I understand that a scar is expected after this procedure  I understand that my physician cannot predict if the scar will form a "keloid," which extends beyond the borders of the wound that is created  A keloid is a thick, painful, and bumpy scar  A keloid can be difficult to treat, as it does not always respond well to therapy, which includes injecting cortisone directly into the keloid every few weeks  While this usually reduces the pain and size of the scar, it does not eliminate it  I understand that photographs may be taken before and after the procedure  These will be maintained as part of the medical providers confidential records and may not be made available to me  I further authorize the medical provider to use the photographs for teaching purposes or to illustrate scientific papers, books, or lectures if in his/her judgment, medical research, education, or science may benefit from its use  I have had an opportunity to fully inquire about the risks and benefits of this procedure and its alternatives  I have been given ample time and opportunity to ask questions and to seek a second opinion if I wished to do so  I acknowledge that there have specifically been no guarantees as to the cosmetic results from the procedure  I am aware that with any procedure there is always the possibility of an unexpected complication  III  POST-PROCEDURAL CARE (WHAT YOU WILL NEED TO DO "AFTER THE BIOPSY" TO OPTIMIZE HEALING)     Keep the area clean and dry    Try NOT to remove the bandage or get it wet for the first 24 hours   Gently clean the area and apply surgical ointment (such as Vaseline petrolatum ointment, which is available "over the counter" and not a prescription) to the biopsy site for up to 2 weeks straight  This acts to protect the wound from the outside world   Sutures are not usually placed in this procedure  If any sutures were placed, return for suture removal as instructed (generally 1 week for the face, 2 weeks for the body)   Take Acetaminophen (Tylenol) for discomfort, if no contraindications  Ibuprofen or aspirin could make bleeding worse   Call our office immediately for signs of infection: fever, chills, increased redness, warmth, tenderness, discomfort/pain, or pus or foul smell coming from the wound  WHAT TO DO IF THERE IS ANY BLEEDING? If a small amount of bleeding is noticed, place a clean cloth over the area and apply firm pressure for ten minutes  Check the wound after 10 minutes of direct pressure  If bleeding persists, try one more time for an additional 10 minutes of direct pressure on the area  If the bleeding becomes heavier or does not stop after the second attempt, or if you have any other questions about this procedure, then please call your [facility] Dermatologist by calling [phone] (SKIN)  I hereby acknowledge that I have reviewed and verified the site with my Dermatologist and have requested and authorized my Dermatologist to proceed with the procedure        4  SEBORRHEIC DERMATITIS    Physical Exam:   Anatomic Location Affected:  Nasal Labial Fold    Morphological Description:  Grease scaly patches    Assessment and Plan:  Based on a thorough discussion of this condition and the management approach to it (including a comprehensive discussion of the known risks, side effects and potential benefits of treatment), the patient (family) agrees to implement the following specific plan:   Start OTC Hydrocortisone ointment twice a day for 3 days straight  Seborrheic Dermatitis   Seborrheic dermatitis is a common, chronic or relapsing form of eczema/dermatitis that mainly affects the sebaceous, gland-rich regions of the scalp, face, and trunk  There are infantile and adult forms of seborrhoeic dermatitis  It is sometimes associated with psoriasis and, in that clinical scenario, may be referred to as "sebo-psoriasis "  Seborrheic dermatitis is also known as "seborrheic eczema "  Dandruff (also called "pityriasis capitis") is an uninflamed form of seborrhoeic dermatitis  Dandruff presents as bran-like scaly patches scattered within hair-bearing areas of the scalp  In an infant, this condition may be referred to as "cradle cap "  The cause of seborrheic dermatitis is not completely understood  It is associated with proliferation of various species of the skin commensal Malassezia, in its yeast (non-pathogenic) form  Its metabolites (such as the fatty acids oleic acid, malssezin, and indole-3-carbaldehyde) may cause an inflammatory reaction  Differences in skin barrier lipid content and function may account for individual presentations  Infantile Seborrheic Dermatitis  Infantile seborrheic dermatitis affects babies under the age of 1 months and usually resolves by 1012 months of age  Infantile seborrheic dermatitis causes "cradle cap" (diffuse, greasy scaling on scalp)  The rash may spread to affect armpit and groin folds (a type of "napkin dermatitis")  There may be associated salmon-pink colored patches that may flake or peel  The rash in this case is usually not especially itchy, so the baby often appears undisturbed by the rash, even when more generalized  Adult Seborrheic Dermatitis  Adult seborrheic dermatitis tends to begin in late adolescence; prevalence is greatest in young adults and in the elderly  It is more common in males than in females      The following factors are sometimes associated with severe adult seborrheic dermatitis:   Oily skin   Familial tendency to seborrhoeic dermatitis or a family history of psoriasis   Immunosuppression: organ transplant recipient, human immunodeficiency virus (HIV) infection and patients with lymphoma   Neurological and psychiatric diseases: Parkinson disease, tardive dyskinesia, depression, epilepsy, facial nerve palsy, spinal cord injury and congenital disorders such as Down syndrome   Treatment for psoriasis with psoralen and ultraviolet A (PUVA) therapy   Lack of sleep   Stressful events  In adults, seborrheic dermatitis may typically affect the scalp, face (creases around the nose, behind ears, within eyebrows) and upper trunk  Typical clinical features include:   Winter flares, improving in summer following sun exposure   Minimal itch most of the time   Combination oily and dry mid-facial skin   Ill-defined localized scaly patches or diffuse scale in the scalp   Blepharitis; scaly red eyelid margins   [place]-pink, thin, scaly, and ill-defined plaques in skin folds on both sides of the face   Petal or ring-shaped flaky patches on hair-line and on anterior chest   Rash in armpits, under the breasts, in the groin folds and genital creases   Superficial folliculitis (inflamed hair follicles) on cheeks and upper trunk    Seborrheic dermatitis is diagnosed by its clinical appearance and behavior  Skin biopsy may be helpful but is rarely necessary to make this diagnosis  5  ROSACEA    Physical Exam:   Anatomic Location Affected:  Nasal Labial Fold, Oral area face   Morphological Description:  Fine papular erythema, well controlled   Pertinent Positives:   Pertinent Negatives: Additional History of Present Condition:  Located on the nasal labial fold  Currently treating with azelaic acid cream once daily  Much improve       Assessment and Plan:  Based on a thorough discussion of this condition and the management approach to it (including a comprehensive discussion of the known risks, side effects and potential benefits of treatment), the patient (family) agrees to implement the following specific plan:   Continue azelaic acid cream once daily  Rosacea is a chronic rash affecting the mid-face including the nose, cheeks, chin, forehead, and eyelids  The incidence is usually greatest between the ages of 30-60 years and is more common in people with fair skin  Common characteristics include redness, telangiectasias, papules and pustules over affected areas  Rosacea may look similar to acne, but there is a lack of comedones  Occasionally the eyes may also be involved in ocular rosacea  In advanced disease, enlargement of the sebaceous glands in the nose, termed rhinophyma, may be present  Rosacea results in red spots (papules) and sometimes pustules over the face, but unlike acne there are no blackheads, whiteheads, or cystic nodules  Patients often experience increased facial flushing with prominent blood vessels (erythematotelangiectatic rosacea) and dry, sensitive skin  These symptoms are exacerbated by sun exposure, hot or spicy foods, topical steroids and oil-based facial products  In ocular rosacea, eyelids may be red and sore due to conjunctivitis, keratitis, and episcleritis  If rhinophyma develops due to enlargement of sebaceous glands, the patient may have an enlarged and irregularly shaped nose with prominent pores  In rosacea that is refractory to treatment, patients can develop persistent redness and swelling of the face due to lymphatic obstruction (Morbihan disease)  Distribution around the cheeks may be confused with the malar or butterfly rash of lupus  However, the rash of lupus spares the nasal creases and lacks papules and pustules   If signs of photosensitivity, oral ulcers, arthritis, and kidney dysfunction are present then consider referral to a rheumatologist      There are many potential causes of rosacea including genetic, environmental, vascular, and inflammatory factors  These include, but are not limited to:   Chronic exposure to ultraviolet radiation    Increased immune responses in the form of cathelicidins that promote vessel dilation and infiltration with white blood cells (neutrophils) into the dermis   Increased matrix metalloproteinases such as collagen and elastase that remodel normal tissue may contribute to inflammation of the skin making it thicker and harder   There is some evidence to suggest that increased numbers of demodex mites on patient skin may contribute to rosacea papules     General Treatment Approach    Avoid exacerbating factors such as heat, spicy foods, and alcohol    Use daily SPF30+ sunscreen and other methods of coverage for sun protection   Use water-based make-up    Avoid applying topical steroids to affected areas as they can cause perioral dermatitis and exacerbate rosacea     Topical Treatment Approach   Metronidazole cream or gel by itself or in combination with oral antibiotics for more severe cases   Azelaic acid cream or lotion is effective for mild inflammatory rosacea when applied twice daily to affected areas   Brimonidine gel and oxymetazoline hydrochloride cream can reduce facial redness temporarily    Ivermectin cream can treat papulopustular rosacea by controlling demodex mites and inflammation    Pimecrolimus cream or tacrolimus ointment twice a day for 2-3 months can help reduce inflammation    Oral Treatment Approach   Antibiotics such as doxycycline, minocycline, or erythromycin for 1-3 months   Clonidine and carvedilol can help reduce facial flushing and are generally well tolerated  Common side effects include low blood pressure, gastrointestinal upset, dry eyes, blurred vision and low heart rate   Isotretinoin at low doses can be effective for long term treatment when antibiotics fail  Side effects may make it unsuitable for some patients      NSAIDs such as diclofenac can help reduce discomfort and redness in the skin  Procedural/Surgical Treatment Approach    Vascular lasers or intense pulsed light treatment may be used to treat persistent telangiectasia and papulopustular rosacea   Plastic surgery and carbon dioxide lasers may be used to treat rhinophyma   6  MILIUM     Physical Exam:   Anatomic Location Affected:  Left Cheek   Morphological Description:  Small Cystic papule       Additional History of Present Condition:  Located on the left cheek  Present for a few months  Irritated  No prior treatment attempted  Assessment and Plan:  Based on a thorough discussion of this condition and the management approach to it (including a comprehensive discussion of the known risks, side effects and potential benefits of treatment), the patient (family) agrees to implement the following specific plan:   Extracted     Assessment and Plan  A milium is a small cyst containing keratin (the skin protein); they are usually multiple and are then known as milia  These harmless cysts present as tiny pearly-white bumps just under the surface of the skin  Milia are common in all ages and both sexes  They most often arise on the face and are particularly prominent on the eyelids and cheeks, but they may occur elsewhere  There are various kinds of milia    milia: Affect 40-50% of  babies, few to numerous lesions, often seen on the nose, but may also arise inside the mouth on the mucosa (David pearls) or palate (Sigifredo nodules) or more widely on the scalp, face and upper trunk, Heal spontaneously within a few weeks of birth   Primary milia in children and adults: found around eyelids, cheeks, forehead and genitalia, in young children, a row of milia may appear along the nasal crease, may clear in a few weeks or persist for months or longer       Juvenile milia: associated with Rombo syndrome, basal cell naevus syndrome, Uewgq-Azlkt-Iwvcaqss syndrome, pachyonychia congenita, Hickman syndrome and other genetic disorders, may be congenital (present at birth) or appear later in life   Milia en plaque: multiple milia appear on within an inflamed plaque up to several centimeters in diameter, usually found on an eyelid, behind the ear, on a cheek or jaw  3  Affect children and adults, especially middle-aged women  4  Sometimes associated with another skin disease including pseudoxanthoma elasticum, discoid lupus erythematosus, lichen planus   Multiple eruptive milia: crops of numerous milia appear over a few weeks to months, lesions may be asymptomatic or itchy, most often affect the face, upper arms and upper trunk   Traumatic milia: occur at the site of injury as skin heals, arise from eccrine sweat ducts, examples include thermal burns, dermabrasion, blistering rashes such as bullous pemphigoid, often seen on the back of hands and fingers in porphyria cutanea tarda, a milia-like calcified nodule may develop after  heel stick blood test     Milia associated with drugs: may rarely follow the use of topical medication, such as phenols, hydroquinone, 5-fluorouracil cream, and a corticosteroid  Milia have a characteristic appearance  However, on occasion, a skin biopsy may be performed  This shows a small epidermoid cyst coming from a vellus hair follicle  Milia should be distinguished from other types of cyst, comedones, xanthelasma and syringomas  Colloid milia are traore coloured bumps on cheeks and temples associated with excessive exposure to sunlight  They should also be distinguished from milia-like cysts noted on dermoscopy in seborrhoeic keratoses, papillomatous moles and some basal cell carcinomas  Milia do not need to be treated unless they are a cause for concern for the patient  They often clear up by themselves within a few months   Where possible, further trauma should be minimised to reduce the development of new lesions   The lesion may be de-roofed using a sterile needle or blade and the contents squeezed or pricked out   They may be destroyed using diathermy and curettage, or cryotherapy   For widespread lesions, topical retinoids may be helpful   Chemical peels, dermabrasion and laser ablation have been reported to be effective when used for very extensive milia   Milia en plaque may improve with minocycline (a tetracycline antibiotic)   PROCEDURE:  DESTRUCTION OF BENIGN LESIONS  After a thorough discussion of treatment options and risk/benefits/alternatives (including but not limited to local pain, scarring, dyspigmentation, blistering, and possible superinfection), verbal and written consent were obtained and the aforementioned lesions were treated on with destruction and extraction   TOTAL NUMBER of 1 lesions were treated today on the ANATOMIC LOCATION: Left cheek  The patient tolerated the procedure well, and after-care instructions were provided      Scribe Attestation    I,:   James Razo MA am acting as a scribe while in the presence of the attending physician :        I,:   Afia Alexander MD personally performed the services described in this documentation    as scribed in my presence :

## 2019-11-29 ENCOUNTER — HOSPITAL ENCOUNTER (OUTPATIENT)
Dept: ULTRASOUND IMAGING | Facility: HOSPITAL | Age: 49
Discharge: HOME | End: 2019-11-29
Payer: COMMERCIAL

## 2019-11-29 DIAGNOSIS — K76.9 LIVER LESION: ICD-10-CM

## 2019-11-29 PROCEDURE — 76705 ECHO EXAM OF ABDOMEN: CPT

## 2019-11-29 NOTE — PROGRESS NOTES
[facility] [facility]  PATIENT:  Ruben William  MRN:  [id number]  :  1970  DATE OF SERVICE:  12/3/2019      Assessment/Plan:     Paroxysmal hemicrania  Preventive therapy for headaches:   - B2 400 mg  - if needed start Magnesium 400 mg a day, any GI issues then decrease to 250 mg  - if needed start CoQ10 100 mg 1-3 times a day  Abortive therapy for headaches:   - at the onset of her headache during the day patient is to take Maxalt 10 mg  If the headache persists patient may take Maxalt in 1-2 hours with diclofenac 50 mg and Compazine 10 mg   - at the onset of aura headache that she wakes up with in the middle the night patient should take her headache cocktail which is Maxalt 10 mg, Compazine 10 mg, diclofenac 50 mg (with milk or food)  If headaches last longer than 24 hours or back to back migraines, please call us         Problem List Items Addressed This Visit        Other    Paroxysmal hemicrania     Preventive therapy for headaches:   - B2 400 mg  - if needed start Magnesium 400 mg a day, any GI issues then decrease to 250 mg  - if needed start CoQ10 100 mg 1-3 times a day  Abortive therapy for headaches:   - at the onset of her headache during the day patient is to take Maxalt 10 mg  If the headache persists patient may take Maxalt in 1-2 hours with diclofenac 50 mg and Compazine 10 mg   - at the onset of aura headache that she wakes up with in the middle the night patient should take her headache cocktail which is Maxalt 10 mg, Compazine 10 mg, diclofenac 50 mg (with milk or food)  If headaches last longer than 24 hours or back to back migraines, please call us         Relevant Medications    prochlorperazine (COMPAZINE) 10 mg tablet    diclofenac potassium (CATAFLAM) 50 mg tablet              History of Present Illness: We had the pleasure of evaluating Ruben William in neurological follow up  today for headaches  As you know,  she is a 54 y o  right handedfemale   She is here today for evaluation of her headaches  She is a dietitian by profession       Past medical history:  QTC:  9/6/2019 451 ms  History of Gastritis/IBS  Thyroid nodules  BRCA2 gene mutation positive/status post bilateral mastectomy in 2014  S/p resection of Melanoma right leg on the medial aspect of her thigh/lyphonoid resection on the same leg  Anxiety/Depression        Headaches:   Any family history of migraines? First cousin  Any family history of aneurysms? none     Have you seen someone else for headaches or pain? Yes      Headaches started at what age? In her 21's    What medications do you take or have you taken for your headaches? Preventive therapy:   - B2  - Venlafaxine  Abortive Therapy:   - Diclofenac, ibuprofen  - Compazine  - Maxalt   Headache are worse if the patient:  exertion  Headache triggers:  Missing meals, weather change  Alternative therapies used in the past for headaches? Massage, chiropractor, yoga    Aura/Warning and how long does it last? Fatigued for an hour or two    What is your current pain level? 0/10     How often do the headaches occur? Sept 5  Oct 7  Nov 4  Dec 1 so far but only 12/3 today     Are you ever headache free? yes      What time of the day do the headaches start? In the middle of the night or in the afternoon     How long do the headaches last?   With medication delay: until takes medication     With medication: in an hour to 2 hours     Describe your usual headache? Starts off dull and then becomes throbbing, shooting/stabbing with moving     Where is your headache located? Starts with the right entire side and has orbital pain and moves to the left side   Occasionally frontalis bilaterally but less common     What is the intensity of pain?   3 to 7/10     Associated symptoms:   - Decrease in appetite, Nausea        - Photophobia, phonophobia, Osmophobia  - Insomnia  - Lacrimation  Nasal congestion/rhinorrhea, red eye  - Stiff or sore neck   - Dizziness light headed  - Problems with concentration  - Blurred vision (usually when headache is receding)     - Prefer to be in a cool, quiet, dark room     Number of days missed per month because of headaches:  Work (or school) days: 0 since last visit (9/19); maybe leaves a bit early  Social or Family activities: 1-4    What time of the year do headaches occur more frequently? Change of season  Have you had trigger point injection performed and how often? No  Have you had Botox injection performed and how often? No   Have you had epidural injections or transforaminal injections performed? No     Have you used CBD or THC for your headaches and how often? No  How many caffeine products to drink a day? none  How much water to drink a day? 16 oz 4-5 bottle     Are you current pregnant or planning on getting pregnant? No - Hysterectomy    Have you ever had any Brain imaging? yes in early 2000s I personally reviewed these images  - Reviewed old notes from physician seen in the past- see above HPI for summary of previous encounters       Past Medical History:   Diagnosis Date    Anxiety and depression     BRCA positive     [facility]     Easy bruising     Easy bruising tendency     Gastritis     Hyperthyroidism     [facility]     1999, inner right thigh    Rosacea        Patient Active Problem List   Diagnosis    BRCA2 gene mutation positive    History of right breast cancer    Symptomatic postsurgical menopause    Anxiety    History of breast reconstruction    Depression    Esophageal reflux    Lymphedema of extremity    Multiple thyroid nodules    Myofascial pain syndrome    Toxic multinodular goiter    Toxic nodular goiter w/o crisis    History of melanoma    Subclinical hyperthyroidism    Irritable bowel syndrome with both constipation and diarrhea    Liver lesion    Vitamin D deficiency    Encounter for follow-up examination after completed treatment for malignant neoplasm    Paroxysmal hemicrania       Medications:      Current Outpatient Medications   Medication Sig Dispense Refill    azelaic acid (AZELEX) 20 % cream Apply 1 application topically 2 (two) times a day      azelastine (ASTELIN) 0 1 % nasal spray 1 spray into each nostril 2 (two) times a day Use in each nostril as directed      Calcium Carb-Cholecalciferol (CALCIUM 500 + D) 500-200 MG-UNIT TABS Take 1 tablet by mouth 2 (two) times a day       cholecalciferol (VITAMIN D3) 1,000 units tablet Take 1,000 Units by mouth 2 (two) times a day      conjugated estrogens (PREMARIN) 0 625 mg tablet Take 1 tablet (0 625 mg total) by mouth daily 90 tablet 1    Cyanocobalamin (B-12) 1000 MCG SUBL Place 1,000 mcg under the tongue      diclofenac potassium (CATAFLAM) 50 mg tablet 1 at the onset of a moderate to severe headache with food, t i d  P r n  10 tablet 0    fluticasone (FLONASE) 50 mcg/act nasal spray 1 spray into each nostril daily      methimazole (TAPAZOLE) 5 mg tablet TAKE ONE TABLET BY MOUTH EVERY OTHER DAY 45 tablet 6    prochlorperazine (COMPAZINE) 10 mg tablet 1 tab at onset of migraine, can repeat in 8 hours, can take with triptan/NSAID 10 tablet 0    Riboflavin (B-2) 100 MG TABS Take 100 mg by mouth 2 (two) times a day      rizatriptan (MAXALT) 10 MG tablet 1 tablet the onset of migraine may repeat 2nd dose after 2 hours 27 tablet 1    venlafaxine (EFFEXOR-XR) 37 5 mg 24 hr capsule Take 1 capsule (37 5 mg total) by mouth daily 90 capsule 1     Current Facility-Administered Medications   Medication Dose Route Frequency Provider Last Rate Last Dose    cyanocobalamin injection 1,000 mcg  1,000 mcg Intramuscular Q30 Days Rocio Bain PA-C   1,000 mcg at 11/06/19 0830    cyanocobalamin injection 1,000 mcg  1,000 mcg Intramuscular Weekly Rocio Bain PA-C   1,000 mcg at 12/03/19 0395        Allergies:       Allergies   Allergen Reactions    Cat Hair Extract Sneezing    Dust Mite Extract Sneezing    Molds & Smuts Sneezing    Morphine Nausea Only, Vomiting and Drowsiness       Family History:     Family History   Problem Relation Age of Onset    Breast cancer Mother     Breast cancer Maternal Aunt     Thyroid disease Maternal Aunt     Liver cancer Father     Kidney cancer Maternal Grandmother     Heart attack Paternal Aunt         Atrial myocardial infarction    Diabetes Paternal Aunt     Colon cancer Family     Lung cancer Family        Social History:     Social History     Socioeconomic History    Marital status: Single     Spouse name: Not on file    Number of children: Not on file    Years of education: Not on file    Highest education level: Not on file   Occupational History    Not on file   Social Needs    Financial resource strain: Not on file    Food insecurity:     Worry: Not on file     Inability: Not on file    Transportation needs:     Medical: Not on file     Non-medical: Not on file   Tobacco Use    Smoking status: Never Smoker    Smokeless tobacco: Never Used   Substance and Sexual Activity    Alcohol use: Yes     Frequency: Monthly or less     Comment: varies    Drug use: No    Sexual activity: Not on file   Lifestyle    Physical activity:     Days per week: Not on file     Minutes per session: Not on file    Stress: Not on file   Relationships    Social connections:     Talks on phone: Not on file     Gets together: Not on file     Attends Christianity service: Not on file     Active member of club or organization: Not on file     Attends meetings of clubs or organizations: Not on file     Relationship status: Not on file    Intimate partner violence:     Fear of current or ex partner: Not on file     Emotionally abused: Not on file     Physically abused: Not on file     Forced sexual activity: Not on file   Other Topics Concern    Not on file   Social History Narrative    Not on file         Objective:   Physical Exam: Vitals:            /72 (BP Location: Left arm, Patient Position: Sitting, Cuff Size: Adult)   Pulse 67   Ht 5' 4" (1 626 m)   Wt 61 7 kg (136 lb)   BMI 23 34 kg/m²   BP Readings from Last 3 Encounters:   12/03/19 108/72   11/06/19 125/85   10/10/19 130/80     Pulse Readings from Last 3 Encounters:   12/03/19 67   11/06/19 80   10/10/19 98                CONSTITUTIONAL: Well developed, well nourished, well groomed  No dysmorphic features  Eyes:  PERRLA, EOM normal      Neck:  Normal ROM, neck supple  HEENT:  Normocephalic atraumatic  No meningismus  Oropharynx is clear and moist  No oral mucosal lesions  Chest:  Respirations regular and unlabored  Cardiovascular:  Distal extremities warm without palpable edema or tenderness, no observed significant swelling  Musculoskeletal:  Full range of motion  (see below under neurologic exam for evaluation of motor function and gait)   Skin:  warm and dry   Psychiatric:  Normal behavior and appropriate affect        SKULL AND SPINE  ROM: Full range of motion  Tenderness: No focal tenderness    MENTAL STATUS  Orientation: Alert and oriented x 3  Fund of knowledge: Intact  CRANIAL NERVES  II: PERRL  Visual fields full  III, IV, VI: Extraocular movements intact  No nystagmus  V: Facial sensation normal V1-V3  VII: Facial movements normal and symmetric  VIII: Intact hearing bilaterally  IX, X: Palate elevation normal and symmetric  XI: Intact trapezius, SCM strength  XII: Tongue protrudes to the midline    MOTOR (Upper and lower extremities)   Bulk/tone/abnormal movement: Normal muscle bulk and tone  Strength: Strength 5/5 throughout  COORDINATION   Station/Gait: Normal baseline gait  Reflexes:  deep tendon reflexes are 2+/4 throughout, flexor response bilaterally       Review of Systems:   Review of Systems   Constitutional: Positive for fatigue  HENT: Negative  Phonophobia    Eyes: Negative      Respiratory: Negative  Cardiovascular: Negative  Gastrointestinal: Positive for nausea  Endocrine: Negative  Genitourinary: Negative  Musculoskeletal: Negative  Skin: Negative  Allergic/Immunologic: Positive for environmental allergies  Neurological: Positive for headaches  Hematological: Negative  Psychiatric/Behavioral: Positive for decreased concentration  I personally reviewed and updated the ROS that was entered by the medical assistant       I have spent 25 minutes with Patient  today in which greater than 50% of this time was spent in counseling/coordination of care regarding Diagnostic results, Prognosis, Risks and benefits of tx options, Intructions for management, Patient and family education, Importance of tx compliance and Risk factor reductions        Author:  Mary Torres PA-C 12/3/2019 11:23 AM

## 2019-12-03 ENCOUNTER — OFFICE VISIT (OUTPATIENT)
Dept: NEUROLOGY | Facility: CLINIC | Age: 49
End: 2019-12-03
Payer: COMMERCIAL

## 2019-12-03 VITALS
HEIGHT: 64 IN | BODY MASS INDEX: 23.22 KG/M2 | WEIGHT: 136 LBS | SYSTOLIC BLOOD PRESSURE: 108 MMHG | HEART RATE: 67 BPM | DIASTOLIC BLOOD PRESSURE: 72 MMHG

## 2019-12-03 DIAGNOSIS — G44.039 PAROXYSMAL HEMICRANIA: ICD-10-CM

## 2019-12-03 PROCEDURE — 96372 THER/PROPH/DIAG INJ SC/IM: CPT

## 2019-12-03 PROCEDURE — 99214 OFFICE O/P EST MOD 30 MIN: CPT

## 2019-12-03 RX ORDER — PROCHLORPERAZINE MALEATE 10 MG
TABLET ORAL
Qty: 10 TABLET | Refills: 0 | Status: SHIPPED | OUTPATIENT
Start: 2019-12-03 | End: 2020-03-02 | Stop reason: SDUPTHER

## 2019-12-03 RX ORDER — DICLOFENAC POTASSIUM 50 MG/1
TABLET, FILM COATED ORAL
Qty: 10 TABLET | Refills: 0 | Status: SHIPPED | OUTPATIENT
Start: 2019-12-03 | End: 2020-03-02 | Stop reason: SDUPTHER

## 2019-12-03 RX ADMIN — CYANOCOBALAMIN 1000 MCG: 1000 INJECTION INTRAMUSCULAR; SUBCUTANEOUS at 09:21

## 2019-12-03 NOTE — ASSESSMENT & PLAN NOTE
Preventive therapy for headaches:   - B2 400 mg  - if needed start Magnesium 400 mg a day, any GI issues then decrease to 250 mg  - if needed start CoQ10 100 mg 1-3 times a day  Abortive therapy for headaches:   - at the onset of her headache during the day patient is to take Maxalt 10 mg  If the headache persists patient may take Maxalt in 1-2 hours with diclofenac 50 mg and Compazine 10 mg   - at the onset of aura headache that she wakes up with in the middle the night patient should take her headache cocktail which is Maxalt 10 mg, Compazine 10 mg, diclofenac 50 mg (with milk or food)      If headaches last longer than 24 hours or back to back migraines, please call us

## 2019-12-03 NOTE — PATIENT INSTRUCTIONS
Preventive therapy for headaches:   - B2 400 mg  - if needed start Magnesium 400 mg a day, any GI issues then decrease to 250 mg  - if needed start CoQ10 100 mg 1-3 times a day  Abortive therapy for headaches:   - at the onset of her headache during the day patient is to take Maxalt 10 mg  If the headache persists patient may take Maxalt in 1-2 hours with diclofenac 50 mg and Compazine 10 mg   - at the onset of aura headache that she wakes up with in the middle the night patient should take her headache cocktail which is Maxalt 10 mg, Compazine 10 mg, diclofenac 50 mg (with milk or food)  If headaches last longer than 24 hours or back to back migraines, please call us    Headache management instructions  - When patient has a moderate to severe headache, they should seek rest, initiate relaxation and apply cold compresses to the head  - Maintain regular sleep schedule  Adults need at least 7-8 hours of uninterrupted a night  - Limit over the counter medications such as Tylenol, Ibuprofen, Aleve, Excedrin  (No more than 2- 3 times a week or max 10 a month)  - Maintain headache diary  Free LAURENCE for a smart phone, which can be used is "Migraine rosa elena"  - Limit caffeine to 1-2 cups 8 to 16 oz a day or less  - Avoid dietary trigger  (aged cheese, peanuts, MSG, aspartame and nitrates)  - Patient is to have regular frequent meals to prevent headache onset  - Please drink at least 64 ounces of water a day to help remain hydrated  Neck pain:   - Neck pathology and poor posture, with straightening of the normal cervical lordosis, can cause headaches  Tightening of the neck muscles can irritate the nerves in the occipital region of the head and cause or worsen head pain  Thus neck strengthening and relaxation exercises, can help improve this particular pain  It is importance to have good posture for improving shoulder, neck, and head pain  - Here are some exercises which should take 5 minutes:     1  Standing, drop your head to one side while continuing to look ahead  Hold for 10 seconds then swap sides  Repeat twice more each side  To increase the stretch, drop the opposite shoulder  2  Standing again, lower your chin to your chest, hold for 10 and then look up to the ceiling and hold for 10  Repeat twice more  3  Next, standing straight again, look over your right shoulder and hold firm for 10 seconds, then over your left shoulder for 10  Repeat this 3 times  4  Finally, while sitting upright, bring your head forward and hold for 10, then all the way back and hold for 10  If this simple exercise does not help improve the posture, we will consider formal physical therapy in the future  Medication overuse headaches:   - Medication overuse headache [facility]) and analgesic overuse can negate the effectiveness of headache preventive measures  Avoid medications with narcotics, barbiturates, or caffeine in them as these can cause rebound headaches after very few doses and can interfere with other headache medicine efficacy  Taking  any acute/abortive over the counter medication or prescription drugs for more than 2-3 days a week can cause medication overuse headache  Importance of Healthy Sleep:  Behavioral sleep changes can promote restful, regular sleep and reduce headache  Simple changes like establishing consistent sleep and wake-up times, as well as getting between 7 and 8 hours of sleep a day, can make a world of difference  Experts also recommend avoiding substances that impair sleep, like caffeine, nicotine and alcohol, and also suggest winding down before bed to prevent sleep problems  To read more go to https://americanmigrainefoundation  org/resource-library/sleep/    Exercising for migraineurs:  Regular exercise can reduce the frequency and intensity of headaches and migraines  When one exercises, the body releases endorphins, which are the bodys natural painkillers   Exercise reduces stress and helps individuals to sleep at night  Exercising at least 30 to 40 minutes 3 times a week is sufficient for most patients  When exercising, follow this plan to prevent headaches:  - First, stay hydrated before, during, and after exercise  - Second part of the exercise plan is to eat sufficient food about an hour and a half before you exercise  Exercise causes ones blood sugar level to decrease, and it is important to have a source of energy    - Final part of the exercise plan is to warm-up  Do not jump into sudden, vigorous exercise if that triggers a headache or migraine  To read more go to https://americanmigrainefoundation  org/resource-library/effects-of-exercise-on-headaches-and-migraines/     Please call with any questions or concerns   Office number is [phone]

## 2019-12-04 ENCOUNTER — TELEPHONE (OUTPATIENT)
Dept: GASTROENTEROLOGY | Facility: MEDICAL CENTER | Age: 49
End: 2019-12-04

## 2019-12-04 ENCOUNTER — EVALUATION (OUTPATIENT)
Dept: PHYSICAL THERAPY | Facility: REHABILITATION | Age: 49
End: 2019-12-04
Payer: COMMERCIAL

## 2019-12-04 DIAGNOSIS — M22.42 CHONDROMALACIA OF PATELLOFEMORAL JOINT, LEFT: Primary | ICD-10-CM

## 2019-12-04 PROCEDURE — 97110 THERAPEUTIC EXERCISES: CPT | Performed by: PHYSICAL THERAPIST

## 2019-12-04 PROCEDURE — 97112 NEUROMUSCULAR REEDUCATION: CPT | Performed by: PHYSICAL THERAPIST

## 2019-12-04 PROCEDURE — 97140 MANUAL THERAPY 1/> REGIONS: CPT | Performed by: PHYSICAL THERAPIST

## 2019-12-04 PROCEDURE — 97116 GAIT TRAINING THERAPY: CPT | Performed by: PHYSICAL THERAPIST

## 2019-12-04 NOTE — TELEPHONE ENCOUNTER
----- Message from Jose Jacques PA-C sent at 12/3/2019  9:24 AM EST -----  Please call and let the patient know her RUQ u/s did not show previously seen liver lesion, only sub centimeter cyst, no further work up is required   Thank you

## 2019-12-04 NOTE — PROGRESS NOTES
Daily Note     Today's date: 2019  Patient name: Doe Eastman  : 1970  MRN: [id number]  Referring provider: Rossana Pantoja MD  Dx:   Encounter Diagnosis     ICD-10-CM    1  Chondromalacia of patellofemoral joint, left M22 42                   Subjective: Reports overall improvement but still has good days and bad days  Objective: See treatment diary below      Assessment: Tolerated treatment well  Patient would benefit from continued PT, did well with all exercises today with no complaints of pain  Focused on improving ambulation with eccentric step downs  Plan: Continue per plan of care        Precautions: hx breast cancer, depression, anxiety       Manual             Teja taping and education CW 8'  CW 8'                                                                    Exercise Diary              Bike 10'             SLR (neutral, ER) 3x10 ea             LAQ 3x10 BTB            Step downs (fwd, lat) 20x ea 6" L            Bridges w/ TB 20x5"             SL clamshells  30x B/L GTB            Sumo squats  3x10 15#                                                                                                                                                                                         Modalities

## 2019-12-09 NOTE — RESULT ENCOUNTER NOTE
I left message that biopsy was read as benign angioma consistent with clinical impression  Results were also left on MyChart and she was asked to call if any clinical questions

## 2019-12-27 ENCOUNTER — TELEPHONE (OUTPATIENT)
Dept: NEUROLOGY | Facility: CLINIC | Age: 49
End: 2019-12-27

## 2019-12-27 NOTE — TELEPHONE ENCOUNTER
Patient calling, states she is returning a call from Courtney Gómezf regarding needed insurance information for upcoming visit  Patient provided the following:  Inna [place]  ID: [id number]  Customer service phone#: [phone]    She did not have her Rx benefit information at this time

## 2020-01-02 ENCOUNTER — CLINICAL SUPPORT (OUTPATIENT)
Dept: NEUROLOGY | Facility: CLINIC | Age: 50
End: 2020-01-02
Payer: COMMERCIAL

## 2020-01-02 DIAGNOSIS — E53.8 B12 DEFICIENCY: Primary | ICD-10-CM

## 2020-01-02 PROCEDURE — 96372 THER/PROPH/DIAG INJ SC/IM: CPT | Performed by: PSYCHIATRY & NEUROLOGY

## 2020-01-02 RX ORDER — CYANOCOBALAMIN 1000 UG/ML
1000 INJECTION INTRAMUSCULAR; SUBCUTANEOUS
Status: SHIPPED | OUTPATIENT
Start: 2020-01-02

## 2020-01-02 RX ADMIN — CYANOCOBALAMIN 1000 MCG: 1000 INJECTION INTRAMUSCULAR; SUBCUTANEOUS at 08:59

## 2020-01-02 NOTE — PROGRESS NOTES
Red Dark presented for a vitamin B12 injection today in the left deltoid  Verbal consent was obtained prior to the procedure  she tolerated the injection well without complication

## 2020-01-08 NOTE — PROGRESS NOTES
Patient will be D/C due to noncompliance, no formal re-evaluation performed, and goals were not assessed

## 2020-01-14 DIAGNOSIS — N95.1 SYMPTOMATIC MENOPAUSAL OR FEMALE CLIMACTERIC STATES: ICD-10-CM

## 2020-01-20 DIAGNOSIS — L70.8 OTHER ACNE: Primary | ICD-10-CM

## 2020-01-20 DIAGNOSIS — L71.9 ROSACEA: ICD-10-CM

## 2020-01-23 ENCOUNTER — TELEPHONE (OUTPATIENT)
Dept: DERMATOLOGY | Facility: CLINIC | Age: 50
End: 2020-01-23

## 2020-01-23 DIAGNOSIS — L71.9 ROSACEA: Primary | ICD-10-CM

## 2020-01-23 NOTE — TELEPHONE ENCOUNTER
Fax received stating Azelex 20% is not covered and needs to be changed to something else  Out of pocket pay is expensive at $500

## 2020-01-23 NOTE — TELEPHONE ENCOUNTER
Cost of azelaic acid 20 % noted    Will request 15% foam  I still prohibitive cost will then use metronidazole cream 0 75

## 2020-01-28 DIAGNOSIS — L71.9 ROSACEA: Primary | ICD-10-CM

## 2020-01-28 NOTE — PROGRESS NOTES
finacea is not approved  Will try metronidazole 0 75 % BID  If not tolerated will then do prior authorization for finacea  Ordered to [facility]

## 2020-02-05 NOTE — PROGRESS NOTES
Established Patient Progress Note       Chief Complaint   Patient presents with    Hyperthyroidism        History of Present Illness:     Linda Mon is a 54 y o  female with a history of toxic adenomas, subclinical hyperthyroidism, and vitamin d deficiency  She had FNA of BL nodules 10 years back which were negative for malignancy and right upper pole nodule in 2013, which showed scattered follicular cells and scant colloid, insufficient for complete cytological evaluation  Her most recent US stable nodules  She denies neck pain, dysphagia, dysphagia, or dyspnea  For hyperthyroidism she is currently methimazole 5 mg every other day  Her most recent thyroid function test was normal  She denies symptoms of hypo or hyperthyroidism  For the vitamin d deficiency she is currently taking 2,000 units daily           Patient Active Problem List   Diagnosis    BRCA2 gene mutation positive    History of right breast cancer    Symptomatic postsurgical menopause    Anxiety    History of breast reconstruction    Depression    Esophageal reflux    Lymphedema of extremity    Multiple thyroid nodules    Myofascial pain syndrome    Toxic multinodular goiter    Toxic nodular goiter w/o crisis    History of melanoma    Subclinical hyperthyroidism    Irritable bowel syndrome with both constipation and diarrhea    Liver lesion    Vitamin D deficiency    Encounter for follow-up examination after completed treatment for malignant neoplasm    Paroxysmal hemicrania      Past Medical History:   Diagnosis Date    Anxiety and depression     BRCA positive     Breast cancer (HCC)     Easy bruising     Easy bruising tendency     Gastritis     Hyperthyroidism     [facility]anoma ([facility] [address] )     1999, inner right thigh    Rosacea       Past Surgical History:   Procedure Laterality Date    BREAST SURGERY      Reported history of breast surgery for biopsy    COLONOSCOPY      HYSTERECTOMY      KNEE SURGERY      MASTECTOMY      OOPHORECTOMY      PELVIC LAPAROSCOPY      MI COLONOSCOPY FLX DX W/COLLJ SPEC WHEN PFRMD N/A 4/16/2019    Procedure: COLONOSCOPY;  Surgeon: Jaime Barr MD;  Location: [facility]; Service: Gastroenterology    MI ESOPHAGOGASTRODUODENOSCOPY TRANSORAL DIAGNOSTIC N/A 4/16/2019    Procedure: ESOPHAGOGASTRODUODENOSCOPY (EGD); Surgeon: Jaime Barr MD;  Location: [facility];   Service: Gastroenterology    SENTINEL LYMPH NODE BIOPSY      SIMPLE MASTECTOMY Bilateral     SKIN LESION EXCISION Right     Excision of lesion lower extremity       Family History   Problem Relation Age of Onset    Breast cancer Mother     Breast cancer Maternal Aunt     Thyroid disease Maternal Aunt     Liver cancer Father     Kidney cancer Maternal Grandmother     Heart attack Paternal Aunt         Atrial myocardial infarction    Diabetes Paternal Aunt     Colon cancer Family     Lung cancer Family      Social History     Tobacco Use    Smoking status: Never Smoker    Smokeless tobacco: Never Used   Substance Use Topics    Alcohol use: Yes     Frequency: Monthly or less     Comment: varies     Allergies   Allergen Reactions    Cat Hair Extract Sneezing    Dust Mite Extract Sneezing    Molds & Smuts Sneezing    Morphine Nausea Only, Vomiting and Drowsiness       Current Outpatient Medications:     azelaic acid (AZELEX) 20 % cream, Apply 1 application topically 2 (two) times a day, Disp: 50 g, Rfl: 3    Azelaic Acid 15 % FOAM, Apply bid to face, Disp: 1 Can, Rfl: 6    azelastine (ASTELIN) 0 1 % nasal spray, 1 spray into each nostril 2 (two) times a day Use in each nostril as directed, Disp: , Rfl:     Calcium Carb-Cholecalciferol (CALCIUM 500 + D) 500-200 MG-UNIT TABS, Take 1 tablet by mouth 2 (two) times a day , Disp: , Rfl:     cholecalciferol (VITAMIN D3) 1,000 units tablet, Take 1,000 Units by mouth 2 (two) times a day, Disp: , Rfl:     conjugated estrogens (PREMARIN) 0 625 mg tablet, Take 1 tablet (0 625 mg total) by mouth daily, Disp: 90 tablet, Rfl: 1    Cyanocobalamin (B-12) 1000 MCG SUBL, Place 1,000 mcg under the tongue, Disp: , Rfl:     diclofenac potassium (CATAFLAM) 50 mg tablet, 1 at the onset of a moderate to severe headache with food, t i d  P r n , Disp: 10 tablet, Rfl: 0    fluticasone (FLONASE) 50 mcg/act nasal spray, 1 spray into each nostril daily, Disp: , Rfl:     methimazole (TAPAZOLE) 5 mg tablet, TAKE ONE TABLET BY MOUTH EVERY OTHER DAY, Disp: 45 tablet, Rfl: 6    metroNIDAZOLE (METROCREAM) 0 75 % cream, Apply topically 2 (two) times a day, Disp: 45 g, Rfl: 3    prochlorperazine (COMPAZINE) 10 mg tablet, 1 tab at onset of migraine, can repeat in 8 hours, can take with triptan/NSAID, Disp: 10 tablet, Rfl: 0    Riboflavin (B-2) 100 MG TABS, Take 100 mg by mouth 2 (two) times a day, Disp: , Rfl:     rizatriptan (MAXALT) 10 MG tablet, 1 tablet the onset of migraine may repeat 2nd dose after 2 hours, Disp: 27 tablet, Rfl: 1    venlafaxine (EFFEXOR-XR) 37 5 mg 24 hr capsule, Take 1 capsule (37 5 mg total) by mouth daily, Disp: 90 capsule, Rfl: 1    Current Facility-Administered Medications:     cyanocobalamin injection 1,000 mcg, 1,000 mcg, Intramuscular, Q30 Days, Jennifer Rocha PA-C, 1,000 mcg at 11/06/19 0830    cyanocobalamin injection 1,000 mcg, 1,000 mcg, Intramuscular, Weekly, Erlinda Cardona PA-C, 1,000 mcg at 12/03/19 4727    cyanocobalamin injection 1,000 mcg, 1,000 mcg, Intramuscular, Q30 Days, Jennifer Rocha PA-C, 1,000 mcg at 01/02/20 1023    Review of Systems   Constitutional: Negative for chills and fever  HENT: Negative for sore throat and trouble swallowing  Eyes: Negative for visual disturbance  Respiratory: Negative for shortness of breath  Cardiovascular: Negative for chest pain and palpitations  Gastrointestinal: Negative for abdominal pain, constipation and diarrhea  Endocrine: Negative for cold intolerance, heat intolerance, polydipsia, polyphagia and polyuria  Genitourinary: Negative for frequency  Musculoskeletal: Negative for arthralgias and myalgias  Skin: Negative for rash  Neurological: Negative for dizziness and tremors  Hematological: Negative for adenopathy  Psychiatric/Behavioral: Negative for sleep disturbance  All other systems reviewed and are negative  Physical Exam:  Body mass index is 24 07 kg/m²  /76   Pulse 71   Ht 5' 4" (1 626 m)   Wt 63 6 kg (140 lb 3 2 oz)   BMI 24 07 kg/m²    Wt Readings from Last 3 Encounters:   02/06/20 63 6 kg (140 lb 3 2 oz)   12/03/19 61 7 kg (136 lb)   11/26/19 62 1 kg (137 lb)       Physical Exam   Constitutional: She is oriented to person, place, and time  She appears well-developed and well-nourished  No distress  HENT:   Head: Normocephalic and atraumatic  Eyes: Pupils are equal, round, and reactive to light  Conjunctivae are normal    Neck: Normal range of motion  Neck supple  No thyromegaly present  Cardiovascular: Normal rate, regular rhythm and normal heart sounds  Pulmonary/Chest: Effort normal and breath sounds normal  No respiratory distress  She has no wheezes  She has no rales  Abdominal: Soft  Bowel sounds are normal  She exhibits no distension  There is no tenderness  Musculoskeletal: Normal range of motion  She exhibits no edema  Neurological: She is alert and oriented to person, place, and time  Skin: Skin is warm and dry  Psychiatric: She has a normal mood and affect  Vitals reviewed  Labs:       Component      Latest Ref Rng & Units 9/19/2019   TSH 3RD GENERATON      0 358 - 3 740 uIU/mL 0 690   Free T4      0 76 - 1 46 ng/dL 0 94       THYROID ULTRASOUND     INDICATION:    E04 2: Nontoxic multinodular goiter  E05 20:  Thyrotoxicosis with toxic multinodular goiter without thyrotoxic crisis or storm      COMPARISON:  6/5/2018; 10/8/2015; 6/11/2014; 7/25/2013; 5/24/2010     TECHNIQUE:   Ultrasound of the thyroid was performed with a high frequency linear transducer in transverse and sagittal planes including volumetric imaging sweeps as well as traditional still imaging technique      FINDINGS:  Thyroid parenchyma is diffusely heterogeneous in echotexture with focal nodule(s) as described below      Right lobe:  5 7 x 1 0 x 1 0 cm  Left lobe:  6 3 x 1 8 x 1 9 cm  Isthmus:  0 1 cm      Nodule #1  Image 6144  RIGHT upper pole nodule measuring 1 2 x 0 7 x 0 8 cm  Unchanged from prior  COMPOSITION:  2 points, solid or almost completely solid   ECHOGENICITY:  1 point, hyperechoic or isoechoic  SHAPE:  0 points, wider-than-tall  MARGIN: 0 points, smooth  ECHOGENIC FOCI:  0 points, none or large comet-tail artifacts  TI-RADS Classification: TR 3 (3 points), Mildly suspicious  FNA if >2 5 cm  Follow if >1 5 cm  This nodule has been stable for greater than 5 years and does not necessitate additional follow-up      Nodule #2  Image 12,033  LEFT lower pole nodule measuring 3 1 x 1 4 x 1 2 cm  This nodule was not measured on the prior study, but in retrospect was present and unchanged based on review of the cine images  This nodule may represent 2 adjacent nodules previously measured   separately  COMPOSITION:  1 point, mixed cystic and solid  ECHOGENICITY:  1 point, hyperechoic or isoechoic  SHAPE:  0 points, wider-than-tall  MARGIN: 0 points, smooth  ECHOGENIC FOCI:  0 points, none or large comet-tail artifacts  TI-RADS Classification: TR 3 (3 points), Mildly suspicious  FNA if >2 5 cm  Follow if >1 5 cm        Nodule #3  Image 18,000 176  LEFT lower pole nodule measuring 1 5 x 1 x 1 5 cm  Unchanged from prior  COMPOSITION:  2 points, solid or almost completely solid   ECHOGENICITY:  1 point, hyperechoic or isoechoic  SHAPE:  0 points, wider-than-tall  MARGIN: 0 points, smooth  ECHOGENIC FOCI:  0 points, none or large comet-tail artifacts  TI-RADS Classification: TR 3 (3 points), Mildly suspicious  FNA if >2 5 cm    Follow if >1 5 cm   This nodule has been stable for greater than 5 years and does not necessitate additional follow-up                 There are additional nodules of lesser size and/or TI-RADS score  At the time of follow-up for the above described dominant nodules, these will be reevaluated in detailed at that time if needed              IMPRESSION:     No nodule meets current ACR criteria for requiring biopsy but followup ultrasound is recommended in 1 year  Impression & Plan:    Problem List Items Addressed This Visit        Endocrine    Toxic multinodular goiter - Primary     Stable nodules, repeat US in one year          Subclinical hyperthyroidism     Continue current dose of Methimazole  She has opted to hold off on SAGE or surgery at this time  Due for repeat thyroid function test           Relevant Orders    T4, free    TSH, 3rd generation       Other    Vitamin D deficiency     Continue vitamin d supplementation                Orders Placed This Encounter   Procedures    T4, free     Standing Status:   Future     Standing Expiration Date:   2/6/2021    TSH, 3rd generation     This is a patient instruction: This test is non-fasting  Please drink two glasses of water morning of bloodwork  Standing Status:   Future     Standing Expiration Date:   2/6/2021         Discussed with the patient and all questioned fully answered  She will call me if any problems arise  Follow-up appointment in 6 months       Counseled patient on diagnostic results, prognosis, risk and benefit of treatment options, instruction for management, importance of treatment compliance, Risk  factor reduction and impressions      Eddy Toney [address]

## 2020-02-06 ENCOUNTER — APPOINTMENT (OUTPATIENT)
Dept: LAB | Facility: HOSPITAL | Age: 50
End: 2020-02-06
Payer: COMMERCIAL

## 2020-02-06 ENCOUNTER — OFFICE VISIT (OUTPATIENT)
Dept: ENDOCRINOLOGY | Facility: CLINIC | Age: 50
End: 2020-02-06
Payer: COMMERCIAL

## 2020-02-06 VITALS
HEART RATE: 71 BPM | HEIGHT: 64 IN | DIASTOLIC BLOOD PRESSURE: 76 MMHG | BODY MASS INDEX: 23.93 KG/M2 | WEIGHT: 140.2 LBS | SYSTOLIC BLOOD PRESSURE: 118 MMHG

## 2020-02-06 DIAGNOSIS — E55.9 VITAMIN D DEFICIENCY: ICD-10-CM

## 2020-02-06 DIAGNOSIS — E05.20 TOXIC MULTINODULAR GOITER: Primary | ICD-10-CM

## 2020-02-06 DIAGNOSIS — E05.90 SUBCLINICAL HYPERTHYROIDISM: ICD-10-CM

## 2020-02-06 LAB
T4 FREE SERPL-MCNC: 0.92 NG/DL (ref 0.76–1.46)
TSH SERPL DL<=0.05 MIU/L-ACNC: 0.56 UIU/ML (ref 0.36–3.74)

## 2020-02-06 PROCEDURE — 36415 COLL VENOUS BLD VENIPUNCTURE: CPT

## 2020-02-06 PROCEDURE — 3008F BODY MASS INDEX DOCD: CPT | Performed by: NURSE PRACTITIONER

## 2020-02-06 PROCEDURE — 84439 ASSAY OF FREE THYROXINE: CPT

## 2020-02-06 PROCEDURE — 1036F TOBACCO NON-USER: CPT | Performed by: NURSE PRACTITIONER

## 2020-02-06 PROCEDURE — 99214 OFFICE O/P EST MOD 30 MIN: CPT | Performed by: NURSE PRACTITIONER

## 2020-02-06 PROCEDURE — 84443 ASSAY THYROID STIM HORMONE: CPT

## 2020-02-06 NOTE — ASSESSMENT & PLAN NOTE
Continue current dose of Methimazole  She has opted to hold off on SAGE or surgery at this time   Due for repeat thyroid function test

## 2020-02-10 ENCOUNTER — CLINICAL SUPPORT (OUTPATIENT)
Dept: NEUROLOGY | Facility: CLINIC | Age: 50
End: 2020-02-10
Payer: COMMERCIAL

## 2020-02-10 DIAGNOSIS — E53.8 B12 DEFICIENCY: Primary | ICD-10-CM

## 2020-02-10 PROCEDURE — 96372 THER/PROPH/DIAG INJ SC/IM: CPT

## 2020-02-10 RX ORDER — CYANOCOBALAMIN 1000 UG/ML
1000 INJECTION INTRAMUSCULAR; SUBCUTANEOUS WEEKLY
Status: SHIPPED | OUTPATIENT
Start: 2020-02-10

## 2020-02-10 RX ADMIN — CYANOCOBALAMIN 1000 MCG: 1000 INJECTION INTRAMUSCULAR; SUBCUTANEOUS at 08:24

## 2020-02-10 NOTE — PROGRESS NOTES
Patient present today for B12 injection  B12 Injection was given in the left deltoid muscle  Patient tolerate injection without any side effects

## 2020-02-12 ENCOUNTER — TELEPHONE (OUTPATIENT)
Dept: ENDOCRINOLOGY | Facility: CLINIC | Age: 50
End: 2020-02-12

## 2020-03-02 DIAGNOSIS — G44.039 PAROXYSMAL HEMICRANIA: ICD-10-CM

## 2020-03-02 RX ORDER — PROCHLORPERAZINE MALEATE 10 MG
TABLET ORAL
Qty: 10 TABLET | Refills: 0 | Status: SHIPPED | OUTPATIENT
Start: 2020-03-02

## 2020-03-02 RX ORDER — DICLOFENAC POTASSIUM 50 MG/1
TABLET, FILM COATED ORAL
Qty: 10 TABLET | Refills: 0 | Status: SHIPPED | OUTPATIENT
Start: 2020-03-02

## 2020-03-02 NOTE — TELEPHONE ENCOUNTER
Medication refill check list    Correct patient? yes   Correct medication name, dose, and pill size? yes   Correct provider? yes   Last and Next appt  scheduled? Yes, last date 12/03/19 & next date 04/8/20   Right pharmacy listed? yes   Correct quantity for 30 or 90 days? yes   Is the patient out of refills? When was it last prescribed? Yes, last date 12/03/19   Directions match what the patient says they are taking?  yes   Enough refills? (none for controlled substances, 1 year for routine medications) yes       diclofenac potassium (CATAFLAM) 50 mg tablet     prochlorperazine (COMPAZINE) 10 mg tablet

## 2020-03-06 ENCOUNTER — CLINICAL SUPPORT (OUTPATIENT)
Dept: NEUROLOGY | Facility: CLINIC | Age: 50
End: 2020-03-06
Payer: COMMERCIAL

## 2020-03-06 DIAGNOSIS — E53.8 VITAMIN B12 DEFICIENCY: ICD-10-CM

## 2020-03-06 PROCEDURE — 96372 THER/PROPH/DIAG INJ SC/IM: CPT | Performed by: PHYSICIAN ASSISTANT

## 2020-03-06 RX ADMIN — CYANOCOBALAMIN 1000 MCG: 1000 INJECTION INTRAMUSCULAR; SUBCUTANEOUS at 08:11

## 2020-03-06 NOTE — PROGRESS NOTES
Procedures  After obtaining consent and direct order from Mallory Villalobos PA-C I injected 1,000mcg of Vitamin B12 into the right deltoid  Patient was instructed to stay in clinic for 15-20 minutes  No adverse reaction was noted   Patient with follow up for next injection

## 2020-03-31 ENCOUNTER — OFFICE VISIT (OUTPATIENT)
Dept: FAMILY MEDICINE CLINIC | Facility: CLINIC | Age: 50
End: 2020-03-31
Payer: COMMERCIAL

## 2020-03-31 VITALS
BODY MASS INDEX: 23.64 KG/M2 | DIASTOLIC BLOOD PRESSURE: 80 MMHG | TEMPERATURE: 97.4 F | HEART RATE: 74 BPM | SYSTOLIC BLOOD PRESSURE: 124 MMHG | HEIGHT: 64 IN | WEIGHT: 138.5 LBS

## 2020-03-31 DIAGNOSIS — Z00.00 ANNUAL PHYSICAL EXAM: Primary | ICD-10-CM

## 2020-03-31 DIAGNOSIS — E04.2 MULTIPLE THYROID NODULES: ICD-10-CM

## 2020-03-31 DIAGNOSIS — Z11.1 SCREENING FOR TUBERCULOSIS: ICD-10-CM

## 2020-03-31 DIAGNOSIS — G43.709 CHRONIC MIGRAINE WITHOUT AURA WITHOUT STATUS MIGRAINOSUS, NOT INTRACTABLE: ICD-10-CM

## 2020-03-31 PROCEDURE — 99396 PREV VISIT EST AGE 40-64: CPT | Performed by: PHYSICIAN ASSISTANT

## 2020-03-31 RX ORDER — RIZATRIPTAN BENZOATE 10 MG/1
TABLET ORAL
Qty: 27 TABLET | Refills: 0 | Status: SHIPPED | OUTPATIENT
Start: 2020-03-31

## 2020-03-31 RX ORDER — FLUTICASONE PROPIONATE 50 MCG
SPRAY, SUSPENSION (ML) NASAL EVERY 24 HOURS
COMMUNITY
Start: 2018-09-25

## 2020-03-31 NOTE — PATIENT INSTRUCTIONS

## 2020-03-31 NOTE — PROGRESS NOTES
[address]    NAME: Gina Ayala  AGE: 54 y o  SEX: female  : 1970     DATE: 3/31/2020     Assessment and Plan:     Problem List Items Addressed This Visit        Endocrine    Multiple thyroid nodules     Followed by endocrinology            Cardiovascular and Mediastinum    Chronic migraine without aura without status migrainosus, not intractable     Currently managed on Maxalt 10 mg PRN migraine  - Refilled         Relevant Medications    rizatriptan (MAXALT) 10 MG tablet      Other Visit Diagnoses     Annual physical exam    -  Primary    Screening for tuberculosis        Relevant Orders    Quantiferon TB Gold Plus      Pt is a 54 y o  female  Vaccines: UTD  Normal TSH, Lipid, A1C within last year  Normal CMP, CBC just over 1 year prior  Immunizations and preventive care screenings were discussed with patient today  Appropriate education was printed on patient's after visit summary  Counseling:  Dental Health: discussed importance of regular tooth brushing, flossing, and dental visits  · Exercise: the importance of regular exercise/physical activity was discussed  Recommend exercise 3-5 times per week for at least 30 minutes  No follow-ups on file  Chief Complaint:     Chief Complaint   Patient presents with    Physical Exam      History of Present Illness:     Adult Annual Physical   Patient here for a comprehensive physical exam  The patient reports employment physical to work at your [facility] [facility] [employer] starting in May  Diet and Physical Activity  · Diet/Nutrition: well balanced diet  · Exercise: 3-4 times a week on average and HIIT training        She is requesting a refill of Maxalt    Depression Screening  PHQ-9 Depression Screening    PHQ-9:    Frequency of the following problems over the past two weeks:       Little interest or pleasure in doing things:  0 - not at all  Feeling down, depressed, or hopeless:  0 - not at all  PHQ-2 Score:  0       General Health  · Sleep: sleeps well and gets 7-8 hours of sleep on average  · Hearing: normal - bilateral   · Vision: no vision problems, wears glasses and when driving  · Dental: regular dental visits and brushes teeth twice daily  /GYN Health  · Patient is: premenopausal  BRCA positive, bilateral mastectomy, nipping sparing in 2014  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  Breast Implant in 2014  MRI in 2018  Total hysterectomy 2011  Continues to see Gyn for annual well woman physical      Review of Systems:     Review of Systems   Constitutional: Negative for activity change, chills, fatigue, fever and unexpected weight change  HENT: Negative for rhinorrhea, sinus pressure and sore throat  Eyes: Negative for visual disturbance  Respiratory: Negative for cough, shortness of breath and wheezing  Cardiovascular: Negative for chest pain, palpitations and leg swelling  Gastrointestinal: Negative for abdominal pain, constipation, diarrhea and nausea  Musculoskeletal: Negative for arthralgias and myalgias  Skin: Negative for pallor, rash and wound  Allergic/Immunologic: Negative for environmental allergies and food allergies  Neurological: Negative for dizziness and headaches  Hematological: Negative for adenopathy  Psychiatric/Behavioral: Negative for behavioral problems, dysphoric mood and sleep disturbance  The patient is not nervous/anxious         Past Medical History:     Past Medical History:   Diagnosis Date    Anxiety and depression     BRCA positive     Breast cancer ([facility] [address] [facility]     Easy bruising     Easy bruising tendency     Gastritis     Hyperthyroidism     Melanoma ([facility] [address] )     1999, inner right thigh    Rosacea       Past Surgical History:     Past Surgical History:   Procedure Laterality Date    BREAST SURGERY      Reported history of breast surgery for biopsy    COLONOSCOPY      HYSTERECTOMY      KNEE SURGERY      MASTECTOMY      OOPHORECTOMY      PELVIC LAPAROSCOPY      KS COLONOSCOPY FLX DX W/COLLJ SPEC WHEN PFRMD N/A 4/16/2019    Procedure: COLONOSCOPY;  Surgeon: Shaan Ferreira MD;  Location: [facility]; Service: Gastroenterology    KS ESOPHAGOGASTRODUODENOSCOPY TRANSORAL DIAGNOSTIC N/A 4/16/2019    Procedure: ESOPHAGOGASTRODUODENOSCOPY (EGD); Surgeon: Shaan Ferreira MD;  Location: [facility];   Service: Gastroenterology    SENTINEL LYMPH NODE BIOPSY      SIMPLE MASTECTOMY Bilateral     SKIN LESION EXCISION Right     Excision of lesion lower extremity       Social History:        Social History     Socioeconomic History    Marital status: Single     Spouse name: None    Number of children: None    Years of education: None    Highest education level: None   Occupational History    None   Social Needs    Financial resource strain: None    Food insecurity:     Worry: None     Inability: None    Transportation needs:     Medical: None     Non-medical: None   Tobacco Use    Smoking status: Never Smoker    Smokeless tobacco: Never Used   Substance and Sexual Activity    Alcohol use: Yes     Frequency: Monthly or less     Comment: varies    Drug use: No    Sexual activity: None   Lifestyle    Physical activity:     Days per week: None     Minutes per session: None    Stress: None   Relationships    Social connections:     Talks on phone: None     Gets together: None     Attends [employer] service: None     Active member of club or organization: None     Attends meetings of clubs or organizations: None     Relationship status: None    Intimate partner violence:     Fear of current or ex partner: None     Emotionally abused: None     Physically abused: None     Forced sexual activity: None   Other Topics Concern    None   Social History Narrative    None      Family History:     Family History   Problem Relation Age of Onset    Breast cancer Mother     Breast cancer Maternal Aunt  Thyroid disease Maternal Aunt     Liver cancer Father     Kidney cancer Maternal Grandmother     Heart attack Paternal Aunt         Atrial myocardial infarction    Diabetes Paternal Aunt     Colon cancer Family     Lung cancer Family       Current Medications:     Current Outpatient Medications   Medication Sig Dispense Refill    azelastine (ASTELIN) 0 1 % nasal spray 1 spray into each nostril 2 (two) times a day Use in each nostril as directed      Calcium Carb-Cholecalciferol (CALCIUM 500 + D) 500-200 MG-UNIT TABS Take 1 tablet by mouth 2 (two) times a day       cholecalciferol (VITAMIN D3) 1,000 units tablet Take 1,000 Units by mouth 2 (two) times a day      conjugated estrogens (PREMARIN) 0 625 mg tablet Take 1 tablet (0 625 mg total) by mouth daily 90 tablet 1    Cyanocobalamin (B-12) 1000 MCG SUBL Place 1,000 mcg under the tongue      diclofenac potassium (CATAFLAM) 50 mg tablet 1 at the onset of a moderate to severe headache with food, t i d  P r n  10 tablet 0    fluticasone (FLONASE) 50 mcg/act nasal spray 1 spray into each nostril daily      methimazole (TAPAZOLE) 5 mg tablet TAKE ONE TABLET BY MOUTH EVERY OTHER DAY 45 tablet 6    metroNIDAZOLE (METROCREAM) 0 75 % cream Apply topically 2 (two) times a day 45 g 3    naphazoline-pheniramine (Naphcon-A) 0 025-0 3 % ophthalmic solution as needed      prochlorperazine (COMPAZINE) 10 mg tablet 1 tab at onset of migraine, can repeat in 8 hours, can take with triptan/NSAID 10 tablet 0    rizatriptan (MAXALT) 10 MG tablet 1 tablet the onset of migraine may repeat 2nd dose after 2 hours 27 tablet 0    venlafaxine (EFFEXOR-XR) 37 5 mg 24 hr capsule Take 1 capsule (37 5 mg total) by mouth daily 90 capsule 1    azelaic acid (AZELEX) 20 % cream Apply 1 application topically 2 (two) times a day (Patient not taking: Reported on 3/31/2020) 50 g 3    Azelaic Acid 15 % FOAM Apply bid to face (Patient not taking: Reported on 3/31/2020) 1 Can 6    fluticasone (FLONASE) 50 mcg/act nasal spray every 24 hours      Riboflavin (B-2) 100 MG TABS Take 100 mg by mouth 2 (two) times a day       Current Facility-Administered Medications   Medication Dose Route Frequency Provider Last Rate Last Dose    cyanocobalamin injection 1,000 mcg  1,000 mcg Intramuscular Q30 Days Bailey Braun, PA-C   1,000 mcg at 11/06/19 0830    cyanocobalamin injection 1,000 mcg  1,000 mcg Intramuscular Weekly Bailey Blaze, PA-C   1,000 mcg at 12/03/19 2658    cyanocobalamin injection 1,000 mcg  1,000 mcg Intramuscular Q30 Days Bailey Blaze, PA-C   1,000 mcg at 03/06/20 3977    cyanocobalamin injection 1,000 mcg  1,000 mcg Intramuscular Weekly Bailey Blafelisha, PA-C   1,000 mcg at 02/10/20 7764      Allergies: Allergies   Allergen Reactions    Cat Hair Extract Sneezing    Dust Mite Extract Sneezing    Molds & Smuts Sneezing    Morphine Nausea Only, Vomiting and Drowsiness      Physical Exam:     /80 (BP Location: Left arm, Patient Position: Sitting, Cuff Size: Standard)   Pulse 74   Temp (!) 97 4 °F (36 3 °C)   Ht 5' 4" (1 626 m)   Wt 62 8 kg (138 lb 8 oz)   BMI 23 77 kg/m²     Physical Exam   Constitutional: She is oriented to person, place, and time  She appears well-developed and well-nourished  No distress  HENT:   Head: Normocephalic and atraumatic  Right Ear: External ear normal    Left Ear: External ear normal    Mouth/Throat: Oropharynx is clear and moist  No oropharyngeal exudate  Eyes: Pupils are equal, round, and reactive to light  Neck: Normal range of motion  Neck supple  No thyromegaly present  Cardiovascular: Normal rate, regular rhythm, normal heart sounds and intact distal pulses  Exam reveals no gallop and no friction rub  No murmur heard  Pulmonary/Chest: Effort normal and breath sounds normal  No respiratory distress  She has no wheezes  She has no rales  Abdominal: Soft  Bowel sounds are normal  She exhibits no distension   There is no tenderness  There is no rebound and no guarding  Musculoskeletal: Normal range of motion  She exhibits no edema or deformity  Lymphadenopathy:     She has no cervical adenopathy  Neurological: She is alert and oriented to person, place, and time  Skin: Skin is warm and dry  No rash noted  She is not diaphoretic  No erythema  Psychiatric: She has a normal mood and affect         Margaret Pierre PA-C  [address]

## 2020-04-02 ENCOUNTER — APPOINTMENT (OUTPATIENT)
Dept: LAB | Facility: HOSPITAL | Age: 50
End: 2020-04-02
Payer: COMMERCIAL

## 2020-04-02 DIAGNOSIS — Z11.1 SCREENING FOR TUBERCULOSIS: ICD-10-CM

## 2020-04-02 PROCEDURE — 36415 COLL VENOUS BLD VENIPUNCTURE: CPT

## 2020-04-02 PROCEDURE — 86480 TB TEST CELL IMMUN MEASURE: CPT

## 2020-04-06 LAB
GAMMA INTERFERON BACKGROUND BLD IA-ACNC: 0.03 IU/ML
M TB IFN-G BLD-IMP: NEGATIVE
M TB IFN-G CD4+ BCKGRND COR BLD-ACNC: 0 IU/ML
M TB IFN-G CD4+ BCKGRND COR BLD-ACNC: 0 IU/ML
MITOGEN IGNF BCKGRD COR BLD-ACNC: >10 IU/ML

## 2020-04-08 ENCOUNTER — TELEMEDICINE (OUTPATIENT)
Dept: NEUROLOGY | Facility: CLINIC | Age: 50
End: 2020-04-08
Payer: COMMERCIAL

## 2020-04-08 ENCOUNTER — TELEPHONE (OUTPATIENT)
Dept: NEUROLOGY | Facility: CLINIC | Age: 50
End: 2020-04-08

## 2020-04-08 ENCOUNTER — CLINICAL SUPPORT (OUTPATIENT)
Dept: NEUROLOGY | Facility: CLINIC | Age: 50
End: 2020-04-08
Payer: COMMERCIAL

## 2020-04-08 DIAGNOSIS — E53.8 B12 DEFICIENCY: ICD-10-CM

## 2020-04-08 DIAGNOSIS — G43.709 CHRONIC MIGRAINE WITHOUT AURA WITHOUT STATUS MIGRAINOSUS, NOT INTRACTABLE: Primary | ICD-10-CM

## 2020-04-08 PROCEDURE — 96372 THER/PROPH/DIAG INJ SC/IM: CPT | Performed by: PSYCHIATRY & NEUROLOGY

## 2020-04-08 PROCEDURE — 99214 OFFICE O/P EST MOD 30 MIN: CPT | Performed by: PHYSICIAN ASSISTANT

## 2020-04-08 RX ORDER — METOCLOPRAMIDE 10 MG/1
10 TABLET ORAL 4 TIMES DAILY
Qty: 10 TABLET | Refills: 2 | Status: SHIPPED | OUTPATIENT
Start: 2020-04-08

## 2020-04-08 RX ORDER — VENLAFAXINE HYDROCHLORIDE 75 MG/1
75 CAPSULE, EXTENDED RELEASE ORAL DAILY
Qty: 30 CAPSULE | Refills: 3 | Status: SHIPPED | OUTPATIENT
Start: 2020-04-08

## 2020-04-08 RX ADMIN — CYANOCOBALAMIN 1000 MCG: 1000 INJECTION INTRAMUSCULAR; SUBCUTANEOUS at 12:27

## 2020-04-10 ENCOUNTER — OFFICE VISIT (OUTPATIENT)
Dept: PLASTIC SURGERY | Facility: CLINIC | Age: 50
End: 2020-04-10
Payer: COMMERCIAL

## 2020-04-10 VITALS — WEIGHT: 135.8 LBS | TEMPERATURE: 98.2 F | RESPIRATION RATE: 16 BRPM | BODY MASS INDEX: 23.31 KG/M2

## 2020-04-10 DIAGNOSIS — Z98.890 STATUS POST BILATERAL BREAST RECONSTRUCTION: ICD-10-CM

## 2020-04-10 DIAGNOSIS — D05.11 DUCTAL CARCINOMA IN SITU (DCIS) OF RIGHT BREAST: Primary | ICD-10-CM

## 2020-04-10 DIAGNOSIS — Z85.3 HISTORY OF RIGHT BREAST CANCER: ICD-10-CM

## 2020-04-10 PROCEDURE — 99213 OFFICE O/P EST LOW 20 MIN: CPT | Performed by: PHYSICIAN ASSISTANT

## 2020-04-10 PROCEDURE — 1036F TOBACCO NON-USER: CPT | Performed by: PHYSICIAN ASSISTANT

## 2020-04-16 ENCOUNTER — DOCUMENTATION (OUTPATIENT)
Dept: PLASTIC SURGERY | Facility: CLINIC | Age: 50
End: 2020-04-16

## 2020-04-16 ENCOUNTER — HOSPITAL ENCOUNTER (OUTPATIENT)
Dept: RADIOLOGY | Facility: HOSPITAL | Age: 50
Discharge: HOME | End: 2020-04-16
Payer: COMMERCIAL

## 2020-04-16 VITALS — WEIGHT: 135 LBS | BODY MASS INDEX: 23.05 KG/M2 | HEIGHT: 64 IN

## 2020-04-16 DIAGNOSIS — D05.11 DUCTAL CARCINOMA IN SITU (DCIS) OF RIGHT BREAST: ICD-10-CM

## 2020-04-16 PROCEDURE — 77047 MRI BREAST C- BILATERAL: CPT

## 2020-05-28 ENCOUNTER — TELEPHONE (OUTPATIENT)
Dept: SURGICAL ONCOLOGY | Facility: CLINIC | Age: 50
End: 2020-05-28

## 2020-06-02 ENCOUNTER — TELEPHONE (OUTPATIENT)
Dept: NEUROLOGY | Facility: CLINIC | Age: 50
End: 2020-06-02

## 2020-07-24 DIAGNOSIS — N95.1 SYMPTOMATIC MENOPAUSAL OR FEMALE CLIMACTERIC STATES: ICD-10-CM

## 2020-08-19 ENCOUNTER — TELEPHONE (OUTPATIENT)
Dept: SURGICAL ONCOLOGY | Facility: CLINIC | Age: 50
End: 2020-08-19

## 2020-08-19 NOTE — TELEPHONE ENCOUNTER
----- Message from [id number] sent at 5/28/2020 10:01 AM EDT -----  Regarding: appt needed  Patient cancelled her appt-  Please call to r/s

## 2020-10-17 DIAGNOSIS — N95.1 SYMPTOMATIC MENOPAUSAL OR FEMALE CLIMACTERIC STATES: ICD-10-CM

## 2020-10-19 RX ORDER — CONJUGATED ESTROGENS 0.62 MG/1
TABLET, FILM COATED ORAL
Qty: 90 TABLET | Refills: 0 | Status: SHIPPED | OUTPATIENT
Start: 2020-10-19

## 2023-05-30 NOTE — TELEPHONE ENCOUNTER
Pt notified Terbinafine Counseling: Patient counseling regarding adverse effects of terbinafine including but not limited to headache, diarrhea, rash, upset stomach, liver function test abnormalities, itching, taste/smell disturbance, nausea, abdominal pain, and flatulence.  There is a rare possibility of liver failure that can occur when taking terbinafine.  The patient understands that a baseline LFT and kidney function test may be required. The patient verbalized understanding of the proper use and possible adverse effects of terbinafine.  All of the patient's questions and concerns were addressed.